# Patient Record
Sex: MALE | Race: WHITE | NOT HISPANIC OR LATINO | Employment: FULL TIME | URBAN - METROPOLITAN AREA
[De-identification: names, ages, dates, MRNs, and addresses within clinical notes are randomized per-mention and may not be internally consistent; named-entity substitution may affect disease eponyms.]

---

## 2018-01-19 ENCOUNTER — TELEPHONE (OUTPATIENT)
Dept: GASTROENTEROLOGY | Facility: CLINIC | Age: 38
End: 2018-01-19

## 2018-01-19 DIAGNOSIS — R94.5 ABNORMAL RESULTS OF LIVER FUNCTION STUDIES: Primary | ICD-10-CM

## 2018-01-19 NOTE — TELEPHONE ENCOUNTER
Per Dr. Price: He needs to see me in 6 months ( I gave him appt number)   What I need help with: he needs labs in about 2 months: LFTs and CBC.  Writer left a message for pt.

## 2018-03-27 ENCOUNTER — TRANSFERRED RECORDS (OUTPATIENT)
Dept: HEALTH INFORMATION MANAGEMENT | Facility: CLINIC | Age: 38
End: 2018-03-27

## 2018-03-28 LAB
ALBUMIN SERPL-MCNC: 4.2 G/DL
ALP SERPL-CCNC: 107 U/L
ALT SERPL-CCNC: 31 U/L
AST SERPL-CCNC: 38 U/L
BILIRUB SERPL-MCNC: 1 MG/DL
BILIRUBIN DIRECT: 0.4 MG/DL
ERYTHROCYTE [DISTWIDTH] IN BLOOD BY AUTOMATED COUNT: 13.8 %
HCT VFR BLD AUTO: 40.1 % (ref 41–53)
HEMOGLOBIN: 13.8 G/DL (ref 13.3–17.7)
MCH RBC QN AUTO: 32.2 PG
MCHC RBC AUTO-ENTMCNC: 34.4 G/DL
MCV RBC AUTO: 93.5 FL
PLATELET # BLD AUTO: 140 10^9/L (ref 150–450)
PROT SERPL-MCNC: 7.4 G/DL
RBC # BLD AUTO: 4.29 10^12/L (ref 4.5–5.9)
WBC # BLD AUTO: 5.5 10^9/L

## 2018-04-02 ENCOUNTER — TELEPHONE (OUTPATIENT)
Dept: GASTROENTEROLOGY | Facility: CLINIC | Age: 38
End: 2018-04-02

## 2018-04-02 DIAGNOSIS — R94.5 ABNORMAL RESULTS OF LIVER FUNCTION STUDIES: Primary | ICD-10-CM

## 2018-04-02 NOTE — TELEPHONE ENCOUNTER
----- Message from Elle Price MD sent at 3/29/2018  3:11 PM CDT -----  Thank you! He can stay on same 2 meds and same doses. Repeat LFTs and CBC in 3 months.  ----- Message -----     From: Latha Fam LPN     Sent: 3/29/2018   2:33 PM       To: Elle Price MD    Meds updated.

## 2018-06-07 ENCOUNTER — TELEPHONE (OUTPATIENT)
Dept: GASTROENTEROLOGY | Facility: CLINIC | Age: 38
End: 2018-06-07

## 2018-06-07 NOTE — TELEPHONE ENCOUNTER
[facility] Call Center    Phone Message    May a detailed message be left on voicemail: yes    Reason for Call: Medication Refill Request    Has the patient contacted the pharmacy for the refill? Yes   Name of medication being requested: URSODIOL PO and AZATHIOPRINE PO  Provider who prescribed the medication: Albert  Pharmacy: [facility]  Date medication is needed: 6/8/18         Action Taken: Message routed to:  [facility] Pharmacy faxed refill request to the clinic on Mon. Pt is almost out so [facility] [phone]

## 2018-06-08 DIAGNOSIS — R94.5 ABNORMAL RESULTS OF LIVER FUNCTION STUDIES: Primary | ICD-10-CM

## 2018-06-08 RX ORDER — URSODIOL 300 MG/1
300 CAPSULE ORAL 2 TIMES DAILY
Qty: 180 CAPSULE | Refills: 0 | Status: SHIPPED | OUTPATIENT
Start: 2018-06-08 | End: 2018-06-19

## 2018-06-08 RX ORDER — AZATHIOPRINE 50 MG/1
75 TABLET ORAL DAILY
Qty: 135 TABLET | Refills: 0 | Status: SHIPPED | OUTPATIENT
Start: 2018-06-08 | End: 2018-06-19

## 2018-06-19 ENCOUNTER — OFFICE VISIT (OUTPATIENT)
Dept: GASTROENTEROLOGY | Facility: CLINIC | Age: 38
End: 2018-06-19
Attending: INTERNAL MEDICINE
Payer: COMMERCIAL

## 2018-06-19 VITALS
HEART RATE: 55 BPM | DIASTOLIC BLOOD PRESSURE: 79 MMHG | WEIGHT: 148.4 LBS | BODY MASS INDEX: 22.49 KG/M2 | SYSTOLIC BLOOD PRESSURE: 129 MMHG | HEIGHT: 68 IN | OXYGEN SATURATION: 100 %

## 2018-06-19 DIAGNOSIS — R94.5 ABNORMAL RESULTS OF LIVER FUNCTION STUDIES: ICD-10-CM

## 2018-06-19 PROBLEM — K75.4 AUTOIMMUNE HEPATITIS (H): Status: ACTIVE | Noted: 2018-06-19

## 2018-06-19 LAB
ALBUMIN SERPL-MCNC: 4.2 G/DL (ref 3.4–5)
ALP SERPL-CCNC: 129 U/L (ref 40–150)
ALT SERPL W P-5'-P-CCNC: 34 U/L (ref 0–70)
ANION GAP SERPL CALCULATED.3IONS-SCNC: 7 MMOL/L (ref 3–14)
AST SERPL W P-5'-P-CCNC: 33 U/L (ref 0–45)
BILIRUB DIRECT SERPL-MCNC: 0.2 MG/DL (ref 0–0.2)
BILIRUB SERPL-MCNC: 0.6 MG/DL (ref 0.2–1.3)
BUN SERPL-MCNC: 15 MG/DL (ref 7–30)
CALCIUM SERPL-MCNC: 9.1 MG/DL (ref 8.5–10.1)
CHLORIDE SERPL-SCNC: 105 MMOL/L (ref 94–109)
CO2 SERPL-SCNC: 27 MMOL/L (ref 20–32)
CREAT SERPL-MCNC: 0.97 MG/DL (ref 0.66–1.25)
ERYTHROCYTE [DISTWIDTH] IN BLOOD BY AUTOMATED COUNT: 13.6 % (ref 10–15)
GFR SERPL CREATININE-BSD FRML MDRD: 88 ML/MIN/1.7M2
GLUCOSE SERPL-MCNC: 75 MG/DL (ref 70–99)
HCT VFR BLD AUTO: 41 % (ref 40–53)
HGB BLD-MCNC: 13.3 G/DL (ref 13.3–17.7)
INR PPP: 1.19 (ref 0.86–1.14)
MCH RBC QN AUTO: 32.9 PG (ref 26.5–33)
MCHC RBC AUTO-ENTMCNC: 32.4 G/DL (ref 31.5–36.5)
MCV RBC AUTO: 102 FL (ref 78–100)
PLATELET # BLD AUTO: 111 10E9/L (ref 150–450)
POTASSIUM SERPL-SCNC: 4.3 MMOL/L (ref 3.4–5.3)
PROT SERPL-MCNC: 7.9 G/DL (ref 6.8–8.8)
RBC # BLD AUTO: 4.04 10E12/L (ref 4.4–5.9)
SODIUM SERPL-SCNC: 139 MMOL/L (ref 133–144)
WBC # BLD AUTO: 3.6 10E9/L (ref 4–11)

## 2018-06-19 PROCEDURE — G0463 HOSPITAL OUTPT CLINIC VISIT: HCPCS | Mod: ZF

## 2018-06-19 PROCEDURE — 80076 HEPATIC FUNCTION PANEL: CPT | Performed by: INTERNAL MEDICINE

## 2018-06-19 PROCEDURE — 85027 COMPLETE CBC AUTOMATED: CPT | Performed by: INTERNAL MEDICINE

## 2018-06-19 PROCEDURE — 80048 BASIC METABOLIC PNL TOTAL CA: CPT | Performed by: INTERNAL MEDICINE

## 2018-06-19 PROCEDURE — 85610 PROTHROMBIN TIME: CPT | Performed by: INTERNAL MEDICINE

## 2018-06-19 PROCEDURE — 36415 COLL VENOUS BLD VENIPUNCTURE: CPT | Performed by: INTERNAL MEDICINE

## 2018-06-19 RX ORDER — AZATHIOPRINE 50 MG/1
50 TABLET ORAL DAILY
Qty: 90 TABLET | Refills: 3 | Status: SHIPPED | OUTPATIENT
Start: 2018-06-19 | End: 2018-08-09 | Stop reason: DRUGHIGH

## 2018-06-19 RX ORDER — URSODIOL 300 MG/1
300 CAPSULE ORAL 2 TIMES DAILY
Qty: 180 CAPSULE | Refills: 3 | Status: SHIPPED | OUTPATIENT
Start: 2018-06-19 | End: 2018-09-11

## 2018-06-19 ASSESSMENT — PAIN SCALES - GENERAL: PAINLEVEL: NO PAIN (0)

## 2018-06-19 NOTE — LETTER
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 Cirrhosis well compensated.     Continue Imuran but decrease to 50 mg po. Labs in 1-2 weeks. Medications renewed.  HCC screening due.  EGD UTD from 2/15/17: no varices    RTC 6 months, labs and US for screening every 6 months.  This was a 30 minute visit, over 50% counseling and coordination of care.    Elle Price MD  Hepatology/Liver Transplant  Medical Director, Liver Transplantation  [facility]  ========================================================================  S:  37 year old you male with AIH.  I previously followed him at . He was diagnosed with cirrhosis of unknown etiology about 3 years ago. Then liver tests became markedly elevated in July 2017: , , Tbili 5, Aphos 343. He was tested for viral etiologies. 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 There was a moderately dense inflammatory infiltrate of lymphocytes, macrophages, eosinophiles and plasma cells. Autoimmune hepatitis could not be excluded, but it was not classic for it. He was tested for antibodies in 2015 and 2014, which were negative.    He was started on prednisone and Imuran. He responded well to this. He has been off prednisone now for about 8 months. He is on Imuran 75 mg. His liver tests have been in the normal range for the last 8 months. ALT and AST uxp856 and 33, respectively. Alkaline phosphatase is 129, total bilirubin is 0.6. His CBC shows hgb 13.1, WBC 3.6, plt 111. INR is 1.19    MELD-Na score: 8 at 6/19/2018  8:56 AM  MELD score: 8 at 6/19/2018  8:56 AM  Calculated from:  Serum Creatinine: 0.97 mg/dL (Rounded to 1) at 6/19/2018  8:56 AM  Serum Sodium: 139 mmol/L (Rounded to 137) at 6/19/2018  8:56 AM  Total Bilirubin: 0.6 mg/dL (Rounded to 1) at 6/19/2018  8:56 AM  INR(ratio):  "1.19 at 6/19/2018  8:56 AM  Age: 37 years    Doing well.  No new health problems. No new family history.    SHx: Wife is pregnant with twins, a boy and a girl. Due end of August.    O:  /79  Pulse 55  Ht 1.727 m (5' 8\")  Wt 67.3 kg (148 lb 6.4 oz)  SpO2 100%  BMI 22.56 kg/m2  Gen: No acute distress  Head: Normocephalic atraumatic  Eyes: Sclera anicteric  Neck: No cervical lymphadenopathy  Respiratory: Clear to auscultation bilaterally, no overt wheezing or rales  CV: RRR   Abdomen:  Soft, nontender, nondistended, normal bowel sounds  Extrem: No edema  Skin: No jaundice, spider angiomata or palmar erythema.  No rashes.   Neuro: Alert and oriented. No asterixis.    MSK: Grossly Intact  Psych: Normal speech, normal affect        Elle Price MD      "

## 2018-06-19 NOTE — LETTER
Date:June 20, 2018      Provider requested that no letter be sent. Do not send.       [facility] Health Information

## 2018-06-19 NOTE — PROGRESS NOTES
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 Cirrhosis well compensated.     Continue Imuran but decrease to 50 mg po. Labs in 1-2 weeks. Medications renewed.  HCC screening due.  EGD UTD from 2/15/17: no varices    RTC 6 months, labs and US for screening every 6 months.  This was a 30 minute visit, over 50% counseling and coordination of care.    Elle Price MD  Hepatology/Liver Transplant  Medical Director, Liver Transplantation  [facility]  ========================================================================  S:  37 year old you male with AIH.  I previously followed him at . He was diagnosed with cirrhosis of unknown etiology about 3 years ago. Then liver tests became markedly elevated in July 2017: , , Tbili 5, Aphos 343. He was tested for viral etiologies. 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 There was a moderately dense inflammatory infiltrate of lymphocytes, macrophages, eosinophiles and plasma cells. Autoimmune hepatitis could not be excluded, but it was not classic for it. He was tested for antibodies in 2015 and 2014, which were negative.    He was started on prednisone and Imuran. He responded well to this. He has been off prednisone now for about 8 months. He is on Imuran 75 mg. His liver tests have been in the normal range for the last 8 months. ALT and AST wss779 and 33, respectively. Alkaline phosphatase is 129, total bilirubin is 0.6. His CBC shows hgb 13.1, WBC 3.6, plt 111. INR is 1.19    MELD-Na score: 8 at 6/19/2018  8:56 AM  MELD score: 8 at 6/19/2018  8:56 AM  Calculated from:  Serum Creatinine: 0.97 mg/dL (Rounded to 1) at 6/19/2018  8:56 AM  Serum Sodium: 139 mmol/L (Rounded to 137) at 6/19/2018  8:56 AM  Total Bilirubin: 0.6 mg/dL (Rounded to 1) at 6/19/2018  8:56 AM  INR(ratio): 1.19 at 6/19/2018  8:56 AM  Age: 37 years    Doing well.  No new health  "problems. No new family history.    SHx: Wife is pregnant with twins, a boy and a girl. Due end of August.    O:  /79  Pulse 55  Ht 1.727 m (5' 8\")  Wt 67.3 kg (148 lb 6.4 oz)  SpO2 100%  BMI 22.56 kg/m2  Gen: No acute distress  Head: Normocephalic atraumatic  Eyes: Sclera anicteric  Neck: No cervical lymphadenopathy  Respiratory: Clear to auscultation bilaterally, no overt wheezing or rales  CV: RRR   Abdomen:  Soft, nontender, nondistended, normal bowel sounds  Extrem: No edema  Skin: No jaundice, spider angiomata or palmar erythema.  No rashes.   Neuro: Alert and oriented. No asterixis.    MSK: Grossly Intact  Psych: Normal speech, normal affect      "

## 2018-06-19 NOTE — MR AVS SNAPSHOT
After Visit Summary   6/19/2018    Kiel Davidson    MRN: [id number]           Patient Information     Date Of Birth          1980        Visit Information        Provider Department      6/19/2018 9:30 AM Elle Price MD [facility] Hepatology        Today's Diagnoses     Abnormal results of liver function studies           Follow-ups after your visit        Follow-up notes from your care team     Return in about 6 months (around 12/19/2018).      Your next 10 appointments already scheduled     Dec 04, 2018  8:00 AM CST   (Arrive by 7:45 AM)   Return General Liver with Elle Price MD   [facility] Hepatology ([facility])    [address]  [address]  [place] [address] [phone]              Future tests that were ordered for you today     Open Future Orders        Priority Expected Expires Ordered    Hepatic panel Routine 6/26/2018 8/19/2018 6/19/2018    CBC with platelets Routine 6/26/2018 8/19/2018 6/19/2018    US Abdomen Limited* Routine  6/19/2019 6/19/2018            Who to contact     If you have questions or need follow up information about today's clinic visit or your schedule please contact [facility] HEPATOLOGY directly at [phone].  Normal or non-critical lab and imaging results will be communicated to you by MyChart, letter or phone within 4 business days after the clinic has received the results. If you do not hear from us within 7 days, please contact the clinic through [employer] or phone. If you have a critical or abnormal lab result, we will notify you by phone as soon as possible.  Submit refill requests through [employer] or call your pharmacy and they will forward the refill request to us. Please allow 3 business days for your refill to be completed.          Additional Information About Your Visit        [employer] Information     [employer] gives you secure access to your electronic health record. If you see a  "primary care provider, you can also send messages to your care team and make appointments. If you have questions, please call your primary care clinic.  If you do not have a primary care provider, please call [phone] and they will assist you.        Care EveryWhere ID     This is your Care EveryWhere ID. This could be used by other organizations to access your [place] medical records  [id number]        Your Vitals Were     Pulse Height Pulse Oximetry BMI (Body Mass Index)          55 1.727 m (5' 8\") 100% 22.56 kg/m2         Blood Pressure from Last 3 Encounters:   06/19/18 129/79    Weight from Last 3 Encounters:   06/19/18 67.3 kg (148 lb 6.4 oz)                 Today's Medication Changes          These changes are accurate as of 6/19/18 10:12 AM.  If you have any questions, ask your nurse or doctor.               These medicines have changed or have updated prescriptions.        Dose/Directions    azaTHIOprine 50 MG tablet   Commonly known as:  IMURAN   This may have changed:  how much to take   Used for:  Abnormal results of liver function studies   Changed by:  Elle Price MD        Dose:  50 mg   Take 1 tablet (50 mg) by mouth daily   Quantity:  90 tablet   Refills:  3            Where to get your medicines      These medications were sent to [facility] [address]  [address], [facility] [address]     Phone:  [phone]     azaTHIOprine 50 MG tablet    ursodiol 300 MG capsule                Primary Care Provider Fax #    Physician No Ref-Primary [phone]       No address on file        Equal Access to Services     DIAMOND ANDERSON AH: Hadii fatemeh ku hadasho Soomaali, waaxda luqadaha, qaybta kaalmada adeegyada, waxay tyson tabares. So [facility] [phone].    ATENCIÓN: Si habla español, tiene a hammonds disposición servicios gratuitos de asistencia lingüística. Llame al [phone].    We comply with applicable federal " civil rights laws and [place] laws. We do not discriminate on the basis of race, color, national origin, age, disability, sex, sexual orientation, or gender identity.            Thank you!     Thank you for choosing [facility] HEPATOLOGY  for your care. Our goal is always to provide you with excellent care. Hearing back from our patients is one way we can continue to improve our services. Please take a few minutes to complete the written survey that you may receive in the mail after your visit with us. Thank you!             Your Updated Medication List - Protect others around you: Learn how to safely use, store and throw away your medicines at www.disposemymeds.org.          This list is accurate as of 6/19/18 10:12 AM.  Always use your most recent med list.                   Brand Name Dispense Instructions for use Diagnosis    azaTHIOprine 50 MG tablet    IMURAN    90 tablet    Take 1 tablet (50 mg) by mouth daily    Abnormal results of liver function studies       ursodiol 300 MG capsule    ACTIGALL    180 capsule    Take 1 capsule (300 mg) by mouth 2 times daily    Abnormal results of liver function studies

## 2018-06-27 ENCOUNTER — TELEPHONE (OUTPATIENT)
Dept: GASTROENTEROLOGY | Facility: CLINIC | Age: 38
End: 2018-06-27

## 2018-06-27 NOTE — TELEPHONE ENCOUNTER
[facility] Call Center    Phone Message    May a detailed message be left on voicemail: yes    Reason for Call: Order(s): Other:   Reason for requested: Lab Orders  Date needed: 6/27/2018  Provider name: Dr. Price    Patient is going into [facility], [facility] tomorrow (6/27/2018) at 8:30am for labs, please send orders to [phone]    Action Taken: Message routed to:  [facility] Hepatology

## 2018-06-29 ENCOUNTER — EXTERNAL ORDER RESULTS (OUTPATIENT)
Dept: GASTROENTEROLOGY | Facility: CLINIC | Age: 38
End: 2018-06-29

## 2018-06-29 LAB
ALBUMIN SERPL-MCNC: 4 G/DL
ALP SERPL-CCNC: 120 U/L
ALT SERPL-CCNC: 27 U/L
AST SERPL-CCNC: 33 U/L
BILIRUB SERPL-MCNC: 0.6 MG/DL
BILIRUBIN DIRECT: 0.3 MG/DL
ERYTHROCYTE [DISTWIDTH] IN BLOOD BY AUTOMATED COUNT: 14 %
HCT VFR BLD AUTO: 41 %
HEMOGLOBIN: 13.4 G/DL (ref 13.3–17.7)
MCH RBC QN AUTO: 32.3 PG
MCHC RBC AUTO-ENTMCNC: 32.7 G/DL
MCV RBC AUTO: 98.8 FL
PLATELET # BLD AUTO: 128 10^9/L (ref 150–450)
PROT SERPL-MCNC: 7.4 G/DL
RBC # BLD AUTO: 4.15 10^12/L (ref 4.5–5.9)
WBC # BLD AUTO: 3.5 10^9/L (ref 4–11)

## 2018-07-02 DIAGNOSIS — K75.4 AUTOIMMUNE HEPATITIS (H): Primary | ICD-10-CM

## 2018-07-03 ENCOUNTER — TELEPHONE (OUTPATIENT)
Dept: GASTROENTEROLOGY | Facility: CLINIC | Age: 38
End: 2018-07-03

## 2018-07-03 NOTE — TELEPHONE ENCOUNTER
[facility] Call Center    Phone Message    May a detailed message be left on voicemail: yes    Reason for Call: Other: Pt wants to inform Kristen that his US results from [facility] can be viewed on Epic on Care Everywhere     Action Taken: Message routed to:  [facility] ([facility] Hep

## 2018-07-03 NOTE — TELEPHONE ENCOUNTER
[facility] Call Center    Phone Message    May a detailed message be left on voicemail: yes    Reason for Call: Other: Pt called to get results of lab and US from Dr. Price.  Please have Dr. Price follow up with pt.  Thank you.     Action Taken: Other: [facility]atology [facility]

## 2018-07-04 NOTE — TELEPHONE ENCOUNTER
Thanks for letting me know it was done. We don't get notification when it is done outside our system, until the report is faxed, which is often a delay.    The US looks fine. No evidence of any spots or concern for cancer.   Have a good summer and best wishes on your upcoming twins delivery!

## 2018-07-25 DIAGNOSIS — R94.5 ABNORMAL RESULTS OF LIVER FUNCTION STUDIES: ICD-10-CM

## 2018-07-25 LAB
ALBUMIN SERPL-MCNC: 4.2 G/DL (ref 3.4–5)
ALP SERPL-CCNC: 166 U/L (ref 40–150)
ALT SERPL W P-5'-P-CCNC: 53 U/L (ref 0–70)
AST SERPL W P-5'-P-CCNC: 45 U/L (ref 0–45)
BILIRUB DIRECT SERPL-MCNC: 0.2 MG/DL (ref 0–0.2)
BILIRUB SERPL-MCNC: 0.6 MG/DL (ref 0.2–1.3)
ERYTHROCYTE [DISTWIDTH] IN BLOOD BY AUTOMATED COUNT: 13.1 % (ref 10–15)
HCT VFR BLD AUTO: 42.6 % (ref 40–53)
HGB BLD-MCNC: 13.8 G/DL (ref 13.3–17.7)
MCH RBC QN AUTO: 32.5 PG (ref 26.5–33)
MCHC RBC AUTO-ENTMCNC: 32.4 G/DL (ref 31.5–36.5)
MCV RBC AUTO: 100 FL (ref 78–100)
PLATELET # BLD AUTO: 116 10E9/L (ref 150–450)
PROT SERPL-MCNC: 8.1 G/DL (ref 6.8–8.8)
RBC # BLD AUTO: 4.25 10E12/L (ref 4.4–5.9)
WBC # BLD AUTO: 5.4 10E9/L (ref 4–11)

## 2018-07-25 PROCEDURE — 36415 COLL VENOUS BLD VENIPUNCTURE: CPT | Performed by: INTERNAL MEDICINE

## 2018-07-25 PROCEDURE — 80076 HEPATIC FUNCTION PANEL: CPT | Performed by: INTERNAL MEDICINE

## 2018-07-25 PROCEDURE — 85027 COMPLETE CBC AUTOMATED: CPT | Performed by: INTERNAL MEDICINE

## 2018-07-26 ENCOUNTER — TELEPHONE (OUTPATIENT)
Dept: GASTROENTEROLOGY | Facility: CLINIC | Age: 38
End: 2018-07-26

## 2018-07-26 DIAGNOSIS — K75.4 AUTOIMMUNE HEPATITIS (H): Primary | ICD-10-CM

## 2018-07-26 NOTE — TELEPHONE ENCOUNTER
Left a message for pt to call regarding new orders from Dr. Price as follows: Slight uptrend in liver tests with Imuran 50 mg daily. Please recheck in 2 weeks. If still at this level. Would go back to Imuran 75 mg daily.  Will await return call.

## 2018-07-26 NOTE — TELEPHONE ENCOUNTER
Pt returned my call and was instructed of new orders. Pt plans to get labs drawn at a [facility] near his home.

## 2018-08-08 DIAGNOSIS — R94.5 ABNORMAL RESULTS OF LIVER FUNCTION STUDIES: ICD-10-CM

## 2018-08-08 DIAGNOSIS — K75.4 AUTOIMMUNE HEPATITIS (H): ICD-10-CM

## 2018-08-08 LAB
ALBUMIN SERPL-MCNC: 4.2 G/DL (ref 3.4–5)
ALP SERPL-CCNC: 175 U/L (ref 40–150)
ALT SERPL W P-5'-P-CCNC: 55 U/L (ref 0–70)
AST SERPL W P-5'-P-CCNC: 39 U/L (ref 0–45)
BILIRUB DIRECT SERPL-MCNC: 0.2 MG/DL (ref 0–0.2)
BILIRUB SERPL-MCNC: 0.6 MG/DL (ref 0.2–1.3)
ERYTHROCYTE [DISTWIDTH] IN BLOOD BY AUTOMATED COUNT: 12.8 % (ref 10–15)
HCT VFR BLD AUTO: 43.7 % (ref 40–53)
HGB BLD-MCNC: 14.6 G/DL (ref 13.3–17.7)
MCH RBC QN AUTO: 32.6 PG (ref 26.5–33)
MCHC RBC AUTO-ENTMCNC: 33.4 G/DL (ref 31.5–36.5)
MCV RBC AUTO: 98 FL (ref 78–100)
PLATELET # BLD AUTO: 118 10E9/L (ref 150–450)
PROT SERPL-MCNC: 8.2 G/DL (ref 6.8–8.8)
RBC # BLD AUTO: 4.48 10E12/L (ref 4.4–5.9)
WBC # BLD AUTO: 5.5 10E9/L (ref 4–11)

## 2018-08-08 PROCEDURE — 36415 COLL VENOUS BLD VENIPUNCTURE: CPT | Performed by: INTERNAL MEDICINE

## 2018-08-08 PROCEDURE — 80076 HEPATIC FUNCTION PANEL: CPT | Performed by: INTERNAL MEDICINE

## 2018-08-08 PROCEDURE — 85027 COMPLETE CBC AUTOMATED: CPT | Performed by: INTERNAL MEDICINE

## 2018-08-09 ENCOUNTER — TELEPHONE (OUTPATIENT)
Dept: GASTROENTEROLOGY | Facility: CLINIC | Age: 38
End: 2018-08-09

## 2018-08-09 DIAGNOSIS — K75.4 AUTOIMMUNE HEPATITIS (H): Primary | ICD-10-CM

## 2018-08-09 NOTE — TELEPHONE ENCOUNTER
Instructed pt the following per Dr. Price:     Liver tets slightly up. Increase Imuran (azathioprine) to 75 mg po daily. Repeat LFTs and CBC in 2-3 weeks.     Pt able to repeat instructions.

## 2018-08-29 DIAGNOSIS — K75.4 AUTOIMMUNE HEPATITIS (H): ICD-10-CM

## 2018-08-29 LAB
ALBUMIN SERPL-MCNC: 3.7 G/DL (ref 3.4–5)
ALP SERPL-CCNC: 158 U/L (ref 40–150)
ALT SERPL W P-5'-P-CCNC: 39 U/L (ref 0–70)
AST SERPL W P-5'-P-CCNC: 33 U/L (ref 0–45)
BILIRUB DIRECT SERPL-MCNC: 0.2 MG/DL (ref 0–0.2)
BILIRUB SERPL-MCNC: 0.7 MG/DL (ref 0.2–1.3)
DIFFERENTIAL METHOD BLD: ABNORMAL
EOSINOPHIL # BLD AUTO: 1.5 10E9/L (ref 0–0.7)
EOSINOPHIL NFR BLD AUTO: 28 %
ERYTHROCYTE [DISTWIDTH] IN BLOOD BY AUTOMATED COUNT: 12.8 % (ref 10–15)
HCT VFR BLD AUTO: 40.7 % (ref 40–53)
HGB BLD-MCNC: 13.4 G/DL (ref 13.3–17.7)
LYMPHOCYTES # BLD AUTO: 1.1 10E9/L (ref 0.8–5.3)
LYMPHOCYTES NFR BLD AUTO: 22 %
MCH RBC QN AUTO: 32.3 PG (ref 26.5–33)
MCHC RBC AUTO-ENTMCNC: 32.9 G/DL (ref 31.5–36.5)
MCV RBC AUTO: 98 FL (ref 78–100)
MONOCYTES # BLD AUTO: 0.4 10E9/L (ref 0–1.3)
MONOCYTES NFR BLD AUTO: 8 %
NEUTROPHILS # BLD AUTO: 2.2 10E9/L (ref 1.6–8.3)
NEUTROPHILS NFR BLD AUTO: 42 %
PLATELET # BLD AUTO: 97 10E9/L (ref 150–450)
PROT SERPL-MCNC: 7.3 G/DL (ref 6.8–8.8)
RBC # BLD AUTO: 4.15 10E12/L (ref 4.4–5.9)
RBC MORPH BLD: ABNORMAL
WBC # BLD AUTO: 5.2 10E9/L (ref 4–11)

## 2018-08-29 PROCEDURE — 80076 HEPATIC FUNCTION PANEL: CPT | Performed by: INTERNAL MEDICINE

## 2018-08-29 PROCEDURE — 36415 COLL VENOUS BLD VENIPUNCTURE: CPT | Performed by: INTERNAL MEDICINE

## 2018-08-29 PROCEDURE — 85025 COMPLETE CBC W/AUTO DIFF WBC: CPT | Performed by: INTERNAL MEDICINE

## 2018-09-07 DIAGNOSIS — K75.4 AUTOIMMUNE HEPATITIS (H): Primary | ICD-10-CM

## 2018-09-07 RX ORDER — AZATHIOPRINE 50 MG/1
75 TABLET ORAL DAILY
Qty: 135 TABLET | Refills: 3 | Status: SHIPPED | OUTPATIENT
Start: 2018-09-07 | End: 2018-12-04

## 2018-09-11 DIAGNOSIS — R94.5 ABNORMAL RESULTS OF LIVER FUNCTION STUDIES: ICD-10-CM

## 2018-09-11 RX ORDER — URSODIOL 300 MG/1
300 CAPSULE ORAL 2 TIMES DAILY
Qty: 180 CAPSULE | Refills: 3 | Status: SHIPPED | OUTPATIENT
Start: 2018-09-11 | End: 2018-12-04

## 2018-11-30 ENCOUNTER — DOCUMENTATION ONLY (OUTPATIENT)
Dept: LAB | Facility: CLINIC | Age: 38
End: 2018-11-30

## 2018-12-03 ENCOUNTER — APPOINTMENT (OUTPATIENT)
Dept: LAB | Facility: CLINIC | Age: 38
End: 2018-12-03
Payer: COMMERCIAL

## 2018-12-03 DIAGNOSIS — K75.4 AUTOIMMUNE HEPATITIS (H): Primary | ICD-10-CM

## 2018-12-04 ENCOUNTER — OFFICE VISIT (OUTPATIENT)
Dept: GASTROENTEROLOGY | Facility: CLINIC | Age: 38
End: 2018-12-04
Attending: INTERNAL MEDICINE
Payer: COMMERCIAL

## 2018-12-04 VITALS
WEIGHT: 142.2 LBS | SYSTOLIC BLOOD PRESSURE: 139 MMHG | HEIGHT: 68 IN | BODY MASS INDEX: 21.55 KG/M2 | OXYGEN SATURATION: 100 % | DIASTOLIC BLOOD PRESSURE: 87 MMHG | RESPIRATION RATE: 16 BRPM | HEART RATE: 73 BPM | TEMPERATURE: 98 F

## 2018-12-04 DIAGNOSIS — K75.4 AUTOIMMUNE HEPATITIS (H): ICD-10-CM

## 2018-12-04 DIAGNOSIS — R94.5 ABNORMAL RESULTS OF LIVER FUNCTION STUDIES: ICD-10-CM

## 2018-12-04 LAB
ALBUMIN SERPL-MCNC: 4.1 G/DL (ref 3.4–5)
ALP SERPL-CCNC: 168 U/L (ref 40–150)
ALT SERPL W P-5'-P-CCNC: 37 U/L (ref 0–70)
ANION GAP SERPL CALCULATED.3IONS-SCNC: 7 MMOL/L (ref 3–14)
AST SERPL W P-5'-P-CCNC: 30 U/L (ref 0–45)
BILIRUB DIRECT SERPL-MCNC: 0.2 MG/DL (ref 0–0.2)
BILIRUB SERPL-MCNC: 0.8 MG/DL (ref 0.2–1.3)
BUN SERPL-MCNC: 18 MG/DL (ref 7–30)
CALCIUM SERPL-MCNC: 9.1 MG/DL (ref 8.5–10.1)
CHLORIDE SERPL-SCNC: 105 MMOL/L (ref 94–109)
CO2 SERPL-SCNC: 27 MMOL/L (ref 20–32)
CREAT SERPL-MCNC: 0.94 MG/DL (ref 0.66–1.25)
ERYTHROCYTE [DISTWIDTH] IN BLOOD BY AUTOMATED COUNT: 13.2 % (ref 10–15)
GFR SERPL CREATININE-BSD FRML MDRD: >90 ML/MIN/1.7M2
GLUCOSE SERPL-MCNC: 86 MG/DL (ref 70–99)
HCT VFR BLD AUTO: 43 % (ref 40–53)
HGB BLD-MCNC: 13.9 G/DL (ref 13.3–17.7)
INR PPP: 1.13 (ref 0.86–1.14)
MCH RBC QN AUTO: 31.7 PG (ref 26.5–33)
MCHC RBC AUTO-ENTMCNC: 32.3 G/DL (ref 31.5–36.5)
MCV RBC AUTO: 98 FL (ref 78–100)
PLATELET # BLD AUTO: 114 10E9/L (ref 150–450)
POTASSIUM SERPL-SCNC: 3.6 MMOL/L (ref 3.4–5.3)
PROT SERPL-MCNC: 8.2 G/DL (ref 6.8–8.8)
RBC # BLD AUTO: 4.39 10E12/L (ref 4.4–5.9)
SODIUM SERPL-SCNC: 139 MMOL/L (ref 133–144)
WBC # BLD AUTO: 4.9 10E9/L (ref 4–11)

## 2018-12-04 PROCEDURE — 85610 PROTHROMBIN TIME: CPT | Performed by: INTERNAL MEDICINE

## 2018-12-04 PROCEDURE — 80076 HEPATIC FUNCTION PANEL: CPT | Performed by: INTERNAL MEDICINE

## 2018-12-04 PROCEDURE — 80048 BASIC METABOLIC PNL TOTAL CA: CPT | Performed by: INTERNAL MEDICINE

## 2018-12-04 PROCEDURE — 85027 COMPLETE CBC AUTOMATED: CPT | Performed by: INTERNAL MEDICINE

## 2018-12-04 PROCEDURE — 36415 COLL VENOUS BLD VENIPUNCTURE: CPT | Performed by: INTERNAL MEDICINE

## 2018-12-04 RX ORDER — AZATHIOPRINE 50 MG/1
75 TABLET ORAL DAILY
Qty: 135 TABLET | Refills: 3 | Status: SHIPPED | OUTPATIENT
Start: 2018-12-04 | End: 2019-12-30

## 2018-12-04 RX ORDER — URSODIOL 300 MG/1
300 CAPSULE ORAL 2 TIMES DAILY
Qty: 180 CAPSULE | Refills: 3 | Status: SHIPPED | OUTPATIENT
Start: 2018-12-04 | End: 2019-12-30

## 2018-12-04 ASSESSMENT — PAIN SCALES - GENERAL: PAINLEVEL: NO PAIN (0)

## 2018-12-04 NOTE — LETTER
12/4/2018       RE: Kiel Davidson  [address]  [facility] [facility] [address]     Dear Colleague,    Thank you for referring your patient, Kiel Davidson, to the [facility] HEPATOLOGY at [facility]. Please see a copy of my visit note below.    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 Cirrhosis well compensated.      Continue Imuran 75 mg po. Labs every 3 mo. Medications renewed.  HCC screening due for US. Ordered  EGD UTD from 2/15/17: no varices Next 2020  Bone density: normal. Due 2020     RTC 12 mo  This was a 30 minute visit, over 50% counseling and coordination of care.     Elle Price MD  Hepatology/Liver Transplant  Medical Director, Liver Transplantation  [facility]  ========================================================================  S:  37 year old you male with AIH.  I previously followed him at . He was diagnosed with cirrhosis of unknown etiology about 3 years ago. Then liver tests became markedly elevated in July 2017: , , Tbili 5, Aphos 343. He was tested for viral etiologies. 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 There was a moderately dense inflammatory infiltrate of lymphocytes, macrophages, eosinophiles and plasma cells. Autoimmune hepatitis could not be excluded, but it was not classic for it. He was tested for antibodies in 2015 and 2014, which were negative.    He was started on prednisone and Imuran. He responded well to this. He has been off prednisone now for about 8 months. He is on Imuran 75 mg. His liver tests have been in the normal range for the last 8 months. ALT and AST are 347 and 33, respectively. Alkaline phosphatase is 129, total bilirubin is 0.6. His CBC shows hgb 13.1, WBC 3.6, plt 111. INR is 1.19     MELD-Na score: 7 at 12/4/2018  7:36 AM  MELD score: 7 at 12/4/2018  7:36 AM  Calculated from:  Serum  "Creatinine: 0.94 mg/dL (Rounded to 1) at 12/4/2018  7:36 AM  Serum Sodium: 139 mmol/L (Rounded to 137) at 12/4/2018  7:36 AM  Total Bilirubin: 0.8 mg/dL (Rounded to 1) at 12/4/2018  7:36 AM  INR(ratio): 1.13 at 12/4/2018  7:36 AM  Age: 37 years    Doing well.  No new health problems. No new family history.     SHx: Twins were born 5 weeks early, Erin and Jonathan. They are now 4 mo old and doing well.     O:  /87 (BP Location: Right arm, Patient Position: Sitting, Cuff Size: Adult Regular)  Pulse 73  Temp 98  F (36.7  C) (Oral)  Resp 16  Ht 1.727 m (5' 8\")  Wt 64.5 kg (142 lb 3.2 oz)  SpO2 100%  BMI 21.62 kg/m2  Gen: No acute distress  Head: Normocephalic atraumatic  Eyes: Sclera anicteric  Neck: No cervical lymphadenopathy  Respiratory: Clear to auscultation bilaterally, no overt wheezing or rales  CV: RRR   Abdomen:  Soft, nontender, nondistended, normal bowel sounds  Extrem: No edema  Skin: No jaundice, spider angiomata or palmar erythema.  No rashes.   Neuro: Alert and oriented. No asterixis.    MSK: Grossly Intact  Psych: Normal speech, normal affect    Again, thank you for allowing me to participate in the care of your patient.      Sincerely,    Elle Price MD      "

## 2018-12-04 NOTE — LETTER
Date:December 5, 2018      Patient was self referred, no letter generated. Do not send.        [facility] Health Information

## 2018-12-04 NOTE — PROGRESS NOTES
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 Cirrhosis well compensated.      Continue Imuran 75 mg po. Labs every 3 mo. Medications renewed.  HCC screening due for US. Ordered  EGD UTD from 2/15/17: no varices Next 2020  Bone density: normal. Due 2020     RTC 12 mo  This was a 30 minute visit, over 50% counseling and coordination of care.     Elle Price MD  Hepatology/Liver Transplant  Medical Director, Liver Transplantation  [facility]  ========================================================================  S:  37 year old you male with AIH.  I previously followed him at . He was diagnosed with cirrhosis of unknown etiology about 3 years ago. Then liver tests became markedly elevated in July 2017: , , Tbili 5, Aphos 343. He was tested for viral etiologies. 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 There was a moderately dense inflammatory infiltrate of lymphocytes, macrophages, eosinophiles and plasma cells. Autoimmune hepatitis could not be excluded, but it was not classic for it. He was tested for antibodies in 2015 and 2014, which were negative.    He was started on prednisone and Imuran. He responded well to this. He has been off prednisone now for about 8 months. He is on Imuran 75 mg. His liver tests have been in the normal range for the last 8 months. ALT and AST are 347 and 33, respectively. Alkaline phosphatase is 129, total bilirubin is 0.6. His CBC shows hgb 13.1, WBC 3.6, plt 111. INR is 1.19     MELD-Na score: 7 at 12/4/2018  7:36 AM  MELD score: 7 at 12/4/2018  7:36 AM  Calculated from:  Serum Creatinine: 0.94 mg/dL (Rounded to 1) at 12/4/2018  7:36 AM  Serum Sodium: 139 mmol/L (Rounded to 137) at 12/4/2018  7:36 AM  Total Bilirubin: 0.8 mg/dL (Rounded to 1) at 12/4/2018  7:36 AM  INR(ratio): 1.13 at 12/4/2018  7:36 AM  Age: 37 years    Doing well.  No new health problems.  "No new family history.     SHx: Twins were born 5 weeks early, Erin and Jonathan. They are now 4 mo old and doing well.     O:  /87 (BP Location: Right arm, Patient Position: Sitting, Cuff Size: Adult Regular)  Pulse 73  Temp 98  F (36.7  C) (Oral)  Resp 16  Ht 1.727 m (5' 8\")  Wt 64.5 kg (142 lb 3.2 oz)  SpO2 100%  BMI 21.62 kg/m2  Gen: No acute distress  Head: Normocephalic atraumatic  Eyes: Sclera anicteric  Neck: No cervical lymphadenopathy  Respiratory: Clear to auscultation bilaterally, no overt wheezing or rales  CV: RRR   Abdomen:  Soft, nontender, nondistended, normal bowel sounds  Extrem: No edema  Skin: No jaundice, spider angiomata or palmar erythema.  No rashes.   Neuro: Alert and oriented. No asterixis.    MSK: Grossly Intact  Psych: Normal speech, normal affect  "

## 2018-12-04 NOTE — MR AVS SNAPSHOT
After Visit Summary   12/4/2018    Kiel Davidson    MRN: [id number]           Patient Information     Date Of Birth          1980        Visit Information        Provider Department      12/4/2018 8:00 AM Elle Price MD [facility] Hepatology        Today's Diagnoses     Autoimmune hepatitis (H)        Abnormal results of liver function studies           Follow-ups after your visit        Follow-up notes from your care team     Return in about 1 year (around 12/4/2019).      Future tests that were ordered for you today     Open Standing Orders        Priority Remaining Interval Expires Ordered    CBC with platelets Routine 4/4 Every 3 Months 12/4/2019 12/4/2018    Hepatic panel Routine 4/4 Every 3 Months 12/4/2019 12/4/2018            Who to contact     If you have questions or need follow up information about today's clinic visit or your schedule please contact [facility] HEPATOLOGY directly at [phone].  Normal or non-critical lab and imaging results will be communicated to you by iNEWiThart, letter or phone within 4 business days after the clinic has received the results. If you do not hear from us within 7 days, please contact the clinic through iNEWiThart or phone. If you have a critical or abnormal lab result, we will notify you by phone as soon as possible.  Submit refill requests through [employer] or call your pharmacy and they will forward the refill request to us. Please allow 3 business days for your refill to be completed.          Additional Information About Your Visit        MyChart Information     [employer] gives you secure access to your electronic health record. If you see a primary care provider, you can also send messages to your care team and make appointments. If you have questions, please call your primary care clinic.  If you do not have a primary care provider, please call [phone] and they will assist you.        Care EveryWhere ID     This is your Care  "EveryWhere ID. This could be used by other organizations to access your [facility] medical records  [id number]        Your Vitals Were     Pulse Temperature Respirations Height Pulse Oximetry BMI (Body Mass Index)    73 98  F (36.7  C) (Oral) 16 1.727 m (5' 8\") 100% 21.62 kg/m2       Blood Pressure from Last 3 Encounters:   12/04/18 139/87   06/19/18 129/79    Weight from Last 3 Encounters:   12/04/18 64.5 kg (142 lb 3.2 oz)   06/19/18 67.3 kg (148 lb 6.4 oz)                 Where to get your medicines      These medications were sent to [facility] - [place], [place] - [address] [facility]  [address] [facility], [place] [address]     Phone:  [phone]     azaTHIOprine 50 MG tablet    ursodiol 300 MG capsule          Primary Care Provider Fax #    Physician No Ref-Primary [phone]       No address on file        Equal Access to Services     [facility] : Hadii fatemeh ku hadasho Soomaali, waaxda luqadaha, qaybta kaalmada adeegyada, waxay alissain marlys daly . So [facility] [phone].    ATENCIÓN: Si habla español, tiene a hammonds disposición servicios gratuitos de asistencia lingüística. Llame al [phone].    We comply with applicable federal civil rights laws and [place] laws. We do not discriminate on the basis of race, color, national origin, age, disability, sex, sexual orientation, or gender identity.            Thank you!     Thank you for choosing [facility] HEPATOLOGY  for your care. Our goal is always to provide you with excellent care. Hearing back from our patients is one way we can continue to improve our services. Please take a few minutes to complete the written survey that you may receive in the mail after your visit with us. Thank you!             Your Updated Medication List - Protect others around you: Learn how to safely use, store and throw away your medicines at www.disposemymeds.org.          This list is accurate as of 12/4/18  4:16 PM.  Always use " your most recent med list.                   Brand Name Dispense Instructions for use Diagnosis    azaTHIOprine 50 MG tablet    IMURAN    135 tablet    Take 1.5 tablets (75 mg) by mouth daily    Autoimmune hepatitis (H), Abnormal results of liver function studies       ursodiol 300 MG capsule    ACTIGALL    180 capsule    Take 1 capsule (300 mg) by mouth 2 times daily    Abnormal results of liver function studies, Autoimmune hepatitis (H)

## 2018-12-04 NOTE — NURSING NOTE
"Chief Complaint   Patient presents with     RECHECK     autoimmune hepatitis       /87 (BP Location: Right arm, Patient Position: Sitting, Cuff Size: Adult Regular)  Pulse 73  Temp 98  F (36.7  C) (Oral)  Resp 16  Ht 1.727 m (5' 8\")  Wt 64.5 kg (142 lb 3.2 oz)  SpO2 100%  BMI 21.62 kg/m2      Sandra Hodge [facility]  12/4/2018 8:00 AM      "

## 2018-12-13 ENCOUNTER — TELEPHONE (OUTPATIENT)
Dept: GASTROENTEROLOGY | Facility: CLINIC | Age: 38
End: 2018-12-13

## 2018-12-21 ENCOUNTER — TELEPHONE (OUTPATIENT)
Dept: GASTROENTEROLOGY | Facility: CLINIC | Age: 38
End: 2018-12-21

## 2019-01-08 ENCOUNTER — TELEPHONE (OUTPATIENT)
Dept: GASTROENTEROLOGY | Facility: CLINIC | Age: 39
End: 2019-01-08

## 2019-01-08 NOTE — TELEPHONE ENCOUNTER
----- Message from Elle Price MD sent at 1/7/2019  7:23 PM CST -----  Ultrasound shows changes consistent with known cirrhosis.  No evidence for any early liver cancer.  ----- Message -----  From: Latha Fam LPN  Sent: 1/7/2019   9:19 AM  To: Elle Price MD, Latha Fam LPN    There is an US results in care everywhere from H.P. Please review and advise.

## 2019-03-06 DIAGNOSIS — R94.5 ABNORMAL RESULTS OF LIVER FUNCTION STUDIES: ICD-10-CM

## 2019-03-06 DIAGNOSIS — K75.4 AUTOIMMUNE HEPATITIS (H): ICD-10-CM

## 2019-03-06 LAB
ALBUMIN SERPL-MCNC: 4.1 G/DL (ref 3.4–5)
ALP SERPL-CCNC: 165 U/L (ref 40–150)
ALT SERPL W P-5'-P-CCNC: 31 U/L (ref 0–70)
AST SERPL W P-5'-P-CCNC: 25 U/L (ref 0–45)
BILIRUB DIRECT SERPL-MCNC: 0.2 MG/DL (ref 0–0.2)
BILIRUB SERPL-MCNC: 0.6 MG/DL (ref 0.2–1.3)
ERYTHROCYTE [DISTWIDTH] IN BLOOD BY AUTOMATED COUNT: 13.4 % (ref 10–15)
HCT VFR BLD AUTO: 41 % (ref 40–53)
HGB BLD-MCNC: 13.5 G/DL (ref 13.3–17.7)
MCH RBC QN AUTO: 32.7 PG (ref 26.5–33)
MCHC RBC AUTO-ENTMCNC: 32.9 G/DL (ref 31.5–36.5)
MCV RBC AUTO: 99 FL (ref 78–100)
PLATELET # BLD AUTO: 112 10E9/L (ref 150–450)
PROT SERPL-MCNC: 7.8 G/DL (ref 6.8–8.8)
RBC # BLD AUTO: 4.13 10E12/L (ref 4.4–5.9)
WBC # BLD AUTO: 4.1 10E9/L (ref 4–11)

## 2019-03-06 PROCEDURE — 36415 COLL VENOUS BLD VENIPUNCTURE: CPT | Performed by: INTERNAL MEDICINE

## 2019-03-06 PROCEDURE — 80076 HEPATIC FUNCTION PANEL: CPT | Performed by: INTERNAL MEDICINE

## 2019-03-06 PROCEDURE — 85027 COMPLETE CBC AUTOMATED: CPT | Performed by: INTERNAL MEDICINE

## 2019-06-04 ENCOUNTER — TELEPHONE (OUTPATIENT)
Dept: GASTROENTEROLOGY | Facility: CLINIC | Age: 39
End: 2019-06-04

## 2019-06-04 NOTE — TELEPHONE ENCOUNTER
[facility] Call Center    Phone Message    May a detailed message be left on voicemail: yes    Reason for Call: Order(s): Other:   Reason for requested:  US Order to be sent to [facility]  [phone] (Fax)  Date needed:  ASA  Provider name: Albert      Action Taken: Message routed to:  [facility] Hepatology

## 2019-06-11 DIAGNOSIS — K75.4 AUTOIMMUNE HEPATITIS (H): ICD-10-CM

## 2019-06-11 DIAGNOSIS — R94.5 ABNORMAL RESULTS OF LIVER FUNCTION STUDIES: ICD-10-CM

## 2019-06-11 LAB
ALBUMIN SERPL-MCNC: 4.2 G/DL (ref 3.4–5)
ALP SERPL-CCNC: 156 U/L (ref 40–150)
ALT SERPL W P-5'-P-CCNC: 24 U/L (ref 0–70)
AST SERPL W P-5'-P-CCNC: 27 U/L (ref 0–45)
BILIRUB DIRECT SERPL-MCNC: 0.3 MG/DL (ref 0–0.2)
BILIRUB SERPL-MCNC: 0.8 MG/DL (ref 0.2–1.3)
ERYTHROCYTE [DISTWIDTH] IN BLOOD BY AUTOMATED COUNT: 13 % (ref 10–15)
HCT VFR BLD AUTO: 41.6 % (ref 40–53)
HGB BLD-MCNC: 14.1 G/DL (ref 13.3–17.7)
MCH RBC QN AUTO: 33.1 PG (ref 26.5–33)
MCHC RBC AUTO-ENTMCNC: 33.9 G/DL (ref 31.5–36.5)
MCV RBC AUTO: 98 FL (ref 78–100)
PLATELET # BLD AUTO: 108 10E9/L (ref 150–450)
PROT SERPL-MCNC: 8.3 G/DL (ref 6.8–8.8)
RBC # BLD AUTO: 4.26 10E12/L (ref 4.4–5.9)
WBC # BLD AUTO: 7.3 10E9/L (ref 4–11)

## 2019-06-11 PROCEDURE — 36415 COLL VENOUS BLD VENIPUNCTURE: CPT | Performed by: INTERNAL MEDICINE

## 2019-06-11 PROCEDURE — 80076 HEPATIC FUNCTION PANEL: CPT | Performed by: INTERNAL MEDICINE

## 2019-06-11 PROCEDURE — 85027 COMPLETE CBC AUTOMATED: CPT | Performed by: INTERNAL MEDICINE

## 2019-06-12 ENCOUNTER — TRANSFERRED RECORDS (OUTPATIENT)
Dept: HEALTH INFORMATION MANAGEMENT | Facility: CLINIC | Age: 39
End: 2019-06-12

## 2019-06-13 ENCOUNTER — DOCUMENTATION ONLY (OUTPATIENT)
Dept: GASTROENTEROLOGY | Facility: CLINIC | Age: 39
End: 2019-06-13

## 2019-06-14 NOTE — PROGRESS NOTES
Called patient let him know hiis abdominal ultrasound was reviewed and showed changes of chronic liver disease.  No hepatic masses noted.     Patient expressed understanding. No further questions or concerns.

## 2019-09-05 DIAGNOSIS — K75.4 AUTOIMMUNE HEPATITIS (H): ICD-10-CM

## 2019-09-05 DIAGNOSIS — R94.5 ABNORMAL RESULTS OF LIVER FUNCTION STUDIES: ICD-10-CM

## 2019-09-05 LAB
ALBUMIN SERPL-MCNC: 4 G/DL (ref 3.4–5)
ALP SERPL-CCNC: 127 U/L (ref 40–150)
ALT SERPL W P-5'-P-CCNC: 20 U/L (ref 0–70)
AST SERPL W P-5'-P-CCNC: 26 U/L (ref 0–45)
BILIRUB DIRECT SERPL-MCNC: 0.3 MG/DL (ref 0–0.2)
BILIRUB SERPL-MCNC: 0.8 MG/DL (ref 0.2–1.3)
ERYTHROCYTE [DISTWIDTH] IN BLOOD BY AUTOMATED COUNT: 13.1 % (ref 10–15)
HCT VFR BLD AUTO: 41.8 % (ref 40–53)
HGB BLD-MCNC: 13.9 G/DL (ref 13.3–17.7)
MCH RBC QN AUTO: 32.1 PG (ref 26.5–33)
MCHC RBC AUTO-ENTMCNC: 33.3 G/DL (ref 31.5–36.5)
MCV RBC AUTO: 97 FL (ref 78–100)
PLATELET # BLD AUTO: 95 10E9/L (ref 150–450)
PROT SERPL-MCNC: 8.1 G/DL (ref 6.8–8.8)
RBC # BLD AUTO: 4.33 10E12/L (ref 4.4–5.9)
WBC # BLD AUTO: 7.5 10E9/L (ref 4–11)

## 2019-09-05 PROCEDURE — 36415 COLL VENOUS BLD VENIPUNCTURE: CPT | Performed by: INTERNAL MEDICINE

## 2019-09-05 PROCEDURE — 80076 HEPATIC FUNCTION PANEL: CPT | Performed by: INTERNAL MEDICINE

## 2019-09-05 PROCEDURE — 85027 COMPLETE CBC AUTOMATED: CPT | Performed by: INTERNAL MEDICINE

## 2019-11-29 ENCOUNTER — TELEPHONE (OUTPATIENT)
Dept: GASTROENTEROLOGY | Facility: CLINIC | Age: 39
End: 2019-11-29

## 2019-11-29 NOTE — TELEPHONE ENCOUNTER
Ultrasound order faxed and lab orders updated.  Patient aware.    Jaylyn RIVERS LPN  Hepatology Clinic      [facility]    Phone Message    May a detailed message be left on voicemail: yes    Reason for Call: Order(s): Other:   Reason for requested: Ultrasound Orders  Date needed: 11/29/2019  Provider name: Price    Please fax Ultrasound order to #[phone]. Pt is also needing his lab orders updated, they will be expiring 12/4/2019. Please advise      Action Taken: Hepatology

## 2019-12-05 DIAGNOSIS — K75.4 AUTOIMMUNE HEPATITIS (H): ICD-10-CM

## 2019-12-05 DIAGNOSIS — R94.5 ABNORMAL RESULTS OF LIVER FUNCTION STUDIES: ICD-10-CM

## 2019-12-05 LAB
ALBUMIN SERPL-MCNC: 3.7 G/DL (ref 3.4–5)
ALP SERPL-CCNC: 181 U/L (ref 40–150)
ALT SERPL W P-5'-P-CCNC: 36 U/L (ref 0–70)
AST SERPL W P-5'-P-CCNC: 44 U/L (ref 0–45)
BILIRUB DIRECT SERPL-MCNC: 0.3 MG/DL (ref 0–0.2)
BILIRUB SERPL-MCNC: 0.7 MG/DL (ref 0.2–1.3)
ERYTHROCYTE [DISTWIDTH] IN BLOOD BY AUTOMATED COUNT: 15 % (ref 10–15)
HCT VFR BLD AUTO: 39.1 % (ref 40–53)
HGB BLD-MCNC: 12.6 G/DL (ref 13.3–17.7)
MCH RBC QN AUTO: 31.3 PG (ref 26.5–33)
MCHC RBC AUTO-ENTMCNC: 32.2 G/DL (ref 31.5–36.5)
MCV RBC AUTO: 97 FL (ref 78–100)
PLATELET # BLD AUTO: 104 10E9/L (ref 150–450)
PROT SERPL-MCNC: 8.2 G/DL (ref 6.8–8.8)
RBC # BLD AUTO: 4.03 10E12/L (ref 4.4–5.9)
WBC # BLD AUTO: 3.1 10E9/L (ref 4–11)

## 2019-12-05 PROCEDURE — 80076 HEPATIC FUNCTION PANEL: CPT | Performed by: INTERNAL MEDICINE

## 2019-12-05 PROCEDURE — 85027 COMPLETE CBC AUTOMATED: CPT | Performed by: INTERNAL MEDICINE

## 2019-12-05 PROCEDURE — 36415 COLL VENOUS BLD VENIPUNCTURE: CPT | Performed by: INTERNAL MEDICINE

## 2019-12-06 ENCOUNTER — TRANSFERRED RECORDS (OUTPATIENT)
Dept: HEALTH INFORMATION MANAGEMENT | Facility: CLINIC | Age: 39
End: 2019-12-06

## 2019-12-13 ENCOUNTER — TELEPHONE (OUTPATIENT)
Dept: GASTROENTEROLOGY | Facility: CLINIC | Age: 39
End: 2019-12-13

## 2019-12-13 DIAGNOSIS — K75.4 AUTOIMMUNE HEPATITIS (H): Primary | ICD-10-CM

## 2019-12-13 NOTE — TELEPHONE ENCOUNTER
Spoke with patient. Gave patient his lab test and U/S test results per Price. Patient verbalized understanding. Patient will repeat labs in 1 week, lab orders are in the computer.      Marysol Wagner LPN

## 2019-12-19 DIAGNOSIS — K75.4 AUTOIMMUNE HEPATITIS (H): ICD-10-CM

## 2019-12-19 LAB
ALBUMIN SERPL-MCNC: 3.9 G/DL (ref 3.4–5)
ALP SERPL-CCNC: 142 U/L (ref 40–150)
ALT SERPL W P-5'-P-CCNC: 31 U/L (ref 0–70)
AST SERPL W P-5'-P-CCNC: 32 U/L (ref 0–45)
BILIRUB DIRECT SERPL-MCNC: 0.2 MG/DL (ref 0–0.2)
BILIRUB SERPL-MCNC: 0.6 MG/DL (ref 0.2–1.3)
ERYTHROCYTE [DISTWIDTH] IN BLOOD BY AUTOMATED COUNT: 14.8 % (ref 10–15)
HCT VFR BLD AUTO: 41.6 % (ref 40–53)
HGB BLD-MCNC: 13.5 G/DL (ref 13.3–17.7)
MCH RBC QN AUTO: 31.4 PG (ref 26.5–33)
MCHC RBC AUTO-ENTMCNC: 32.5 G/DL (ref 31.5–36.5)
MCV RBC AUTO: 97 FL (ref 78–100)
PLATELET # BLD AUTO: 117 10E9/L (ref 150–450)
PROT SERPL-MCNC: 8.6 G/DL (ref 6.8–8.8)
RBC # BLD AUTO: 4.3 10E12/L (ref 4.4–5.9)
WBC # BLD AUTO: 3.7 10E9/L (ref 4–11)

## 2019-12-19 PROCEDURE — 80076 HEPATIC FUNCTION PANEL: CPT | Performed by: INTERNAL MEDICINE

## 2019-12-19 PROCEDURE — 36415 COLL VENOUS BLD VENIPUNCTURE: CPT | Performed by: INTERNAL MEDICINE

## 2019-12-19 PROCEDURE — 85027 COMPLETE CBC AUTOMATED: CPT | Performed by: INTERNAL MEDICINE

## 2019-12-28 DIAGNOSIS — K75.4 AUTOIMMUNE HEPATITIS (H): ICD-10-CM

## 2019-12-28 DIAGNOSIS — R94.5 ABNORMAL RESULTS OF LIVER FUNCTION STUDIES: ICD-10-CM

## 2019-12-30 RX ORDER — AZATHIOPRINE 50 MG/1
TABLET ORAL
Qty: 135 TABLET | Refills: 3 | Status: SHIPPED | OUTPATIENT
Start: 2019-12-30 | End: 2020-02-11

## 2019-12-30 RX ORDER — URSODIOL 300 MG/1
CAPSULE ORAL
Qty: 180 CAPSULE | Refills: 3 | Status: SHIPPED | OUTPATIENT
Start: 2019-12-30 | End: 2020-02-11

## 2020-01-13 ENCOUNTER — DOCUMENTATION ONLY (OUTPATIENT)
Dept: CARE COORDINATION | Facility: CLINIC | Age: 40
End: 2020-01-13

## 2020-01-30 DIAGNOSIS — K75.4 AUTOIMMUNE HEPATITIS (H): Primary | ICD-10-CM

## 2020-02-10 ENCOUNTER — TELEPHONE (OUTPATIENT)
Dept: GASTROENTEROLOGY | Facility: CLINIC | Age: 40
End: 2020-02-10

## 2020-02-10 ENCOUNTER — PRE VISIT (OUTPATIENT)
Dept: GASTROENTEROLOGY | Facility: CLINIC | Age: 40
End: 2020-02-10

## 2020-02-10 NOTE — PROGRESS NOTES
Was the patient contacted by phone and reminded of the upcoming visit? message left    Was the patient instructed to bring a current list of all medications to the appointment or instructed to bring in all medication bottles? Yes     Was the patient instructed to arrive prior to the appointment time to have ordered labs drawn? Yes     Were the needed lab orders placed? Yes    Was lab appointment made 1 hour or more prior to visit? Yes    Patient instructed to arrive early for check-in    Sandra Hodge [facility]  2/10/2020 12:08 PM

## 2020-02-10 NOTE — TELEPHONE ENCOUNTER
Labs not needed. Patient is aware.    Jaylyn RIVERS LPN  Hepatology Clinic      [facility] Call Center    Phone Message    May a detailed message be left on voicemail: yes     Reason for Call: Patient received a VM from Sandra and he is questioning why he is scheduled for labs tomorrow since he completed labs at [facility] on 12/19/19 for Dr. Price. Caller questioning if labs are still needed since he only needs to do labs every 3 months. Patient requesting a call back to discuss since his appt is early tomorrow morning.    Please advise.  Thank you, Yamilet Gibbs on 2/10/2020 at 1:36 PM     Action Taken: Message routed to:  [facility]Y    Travel Screening: Not Applicable

## 2020-02-11 ENCOUNTER — TELEPHONE (OUTPATIENT)
Dept: GASTROENTEROLOGY | Facility: CLINIC | Age: 40
End: 2020-02-11

## 2020-02-11 ENCOUNTER — OFFICE VISIT (OUTPATIENT)
Dept: GASTROENTEROLOGY | Facility: CLINIC | Age: 40
End: 2020-02-11
Attending: INTERNAL MEDICINE
Payer: COMMERCIAL

## 2020-02-11 VITALS
TEMPERATURE: 97.4 F | WEIGHT: 140.74 LBS | HEIGHT: 68 IN | HEART RATE: 77 BPM | SYSTOLIC BLOOD PRESSURE: 138 MMHG | BODY MASS INDEX: 21.33 KG/M2 | OXYGEN SATURATION: 100 % | RESPIRATION RATE: 18 BRPM | DIASTOLIC BLOOD PRESSURE: 86 MMHG

## 2020-02-11 DIAGNOSIS — R94.5 ABNORMAL RESULTS OF LIVER FUNCTION STUDIES: ICD-10-CM

## 2020-02-11 DIAGNOSIS — K75.4 AUTOIMMUNE HEPATITIS (H): ICD-10-CM

## 2020-02-11 PROCEDURE — G0463 HOSPITAL OUTPT CLINIC VISIT: HCPCS | Mod: ZF

## 2020-02-11 RX ORDER — AZATHIOPRINE 50 MG/1
TABLET ORAL
Qty: 135 TABLET | Refills: 3 | Status: SHIPPED | OUTPATIENT
Start: 2020-02-11 | End: 2021-01-22

## 2020-02-11 RX ORDER — URSODIOL 300 MG/1
CAPSULE ORAL
Qty: 180 CAPSULE | Refills: 3 | Status: SHIPPED | OUTPATIENT
Start: 2020-02-11 | End: 2021-01-22

## 2020-02-11 ASSESSMENT — PAIN SCALES - GENERAL: PAINLEVEL: NO PAIN (0)

## 2020-02-11 ASSESSMENT — MIFFLIN-ST. JEOR: SCORE: 1537.89

## 2020-02-11 NOTE — NURSING NOTE
"Chief Complaint   Patient presents with     RECHECK     autoimmune hepatitis        Vital signs:  Temp: 97.4  F (36.3  C) Temp src: Oral BP: 138/86 Pulse: 77   Resp: 18 SpO2: 100 %     Height: 172.7 cm (5' 8\") Weight: 63.8 kg (140 lb 11.8 oz)  Estimated body mass index is 21.4 kg/m  as calculated from the following:    Height as of this encounter: 1.727 m (5' 8\").    Weight as of this encounter: 63.8 kg (140 lb 11.8 oz).          Sandra Hodge, [facility]  2/11/2020 9:04 AM      "

## 2020-02-11 NOTE — LETTER
2/11/2020     RE: Kiel Davidson  [address] [facility] [address]     Dear Colleague,    Thank you for referring your patient, Keil Davidson, to the [facility] HEPATOLOGY at [facility]. Please see a copy of my visit note below.    [facility] Clinic follow up      A/P  37 year old yo male with AIH cirrhosis. Cirrhosis well compensated.      Continue Imuran 75 mg po daily, eligio 300 bid. Labs every 3 mo. Medications renewed.  HCC screening due for US in June. Ordered  EGD 2/15/17. Due. Ordered   Bone density. Due. Ordered     RTC 12 mo  This was a 30 minute visit, over 50% counseling and coordination of care.     Elle Price MD  Hepatology/Liver Transplant  Medical Director, Liver Transplantation  [facility]  ========================================================================  S:  39 year old you male with AIH. He was diagnosed with cirrhosis of unknown etiology in 2016. Then liver tests became markedly elevated in July 2017: , , Tbili 5, Aphos 343. He was tested for viral etiologies. 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 There was a moderately dense inflammatory infiltrate of lymphocytes, macrophages, eosinophiles and plasma cells. Autoimmune hepatitis could not be excluded, but it was not classic for it. He was tested for antibodies in 2015 and 2014, which were negative.    He was started on prednisone and Imuran. He responded well to this.. He is on Imuran 75 mg. His liver tests have been in the normal range for over a year.     Had a febrile illness in December. Fever 102. Went away on its own     Lab Test 12/19/19  0829   PROTTOTAL 8.6   ALBUMIN 3.9   BILITOTAL 0.6   ALKPHOS 142   AST 32   ALT 31     Lab Test 12/19/19  0829   WBC 3.7*   RBC 4.30*   HGB 13.5   HCT 41.6   MCV 97   MCH 31.4   MCHC 32.5   RDW 14.8   *     HCC screening 12/6/19 no masses  EGD 2/15/17  "normal     MELD-Na score: 7 at 12/4/2018  7:36 AM  MELD score: 7 at 12/4/2018  7:36 AM  Calculated from:  Serum Creatinine: 0.94 mg/dL (Rounded to 1) at 12/4/2018  7:36 AM  Serum Sodium: 139 mmol/L (Rounded to 137) at 12/4/2018  7:36 AM  Total Bilirubin: 0.8 mg/dL (Rounded to 1) at 12/4/2018  7:36 AM  INR(ratio): 1.13 at 12/4/2018  7:36 AM  Age: 37 years     Doing well.  No new health problems. No new family history.     SHx: Twins Erin and Jonathan are 18 months old.     O:  /86 (BP Location: Right arm, Patient Position: Sitting, Cuff Size: Adult Regular)   Pulse 77   Temp 97.4  F (36.3  C) (Oral)   Resp 18   Ht 1.727 m (5' 8\")   Wt 63.8 kg (140 lb 11.8 oz)   SpO2 100%   BMI 21.40 kg/m       Gen: No acute distress  Head: Normocephalic atraumatic  Eyes: Sclera anicteric  Neck: No cervical lymphadenopathy  Respiratory: Clear to auscultation bilaterally, no overt wheezing or rales  CV: RRR   Abdomen:  Soft, nontender, nondistended, normal bowel sounds  Extrem: No edema  Skin: No jaundice, spider angiomata or palmar erythema.  No rashes.   Neuro: Alert and oriented. No asterixis.    MSK: Grossly Intact  Psych: Normal speech, normal affect    Again, thank you for allowing me to participate in the care of your patient.      Sincerely,    Elle Price MD      "

## 2020-02-11 NOTE — PROGRESS NOTES
[facility] Liver Clinic follow up      A/P  37 year old yo male with AIH cirrhosis. Cirrhosis well compensated.      Continue Imuran 75 mg po daily, eligio 300 bid. Labs every 3 mo. Medications renewed.  HCC screening due for US in June. Ordered  EGD 2/15/17. Due. Ordered   Bone density. Due. Ordered     RTC 12 mo  This was a 30 minute visit, over 50% counseling and coordination of care.     Elle Price MD  Hepatology/Liver Transplant  Medical Director, Liver Transplantation  [facility]  ========================================================================  S:  39 year old you male with AIH. He was diagnosed with cirrhosis of unknown etiology in 2016. Then liver tests became markedly elevated in July 2017: , , Tbili 5, Aphos 343. He was tested for viral etiologies. 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 There was a moderately dense inflammatory infiltrate of lymphocytes, macrophages, eosinophiles and plasma cells. Autoimmune hepatitis could not be excluded, but it was not classic for it. He was tested for antibodies in 2015 and 2014, which were negative.    He was started on prednisone and Imuran. He responded well to this.. He is on Imuran 75 mg. His liver tests have been in the normal range for over a year.     Had a febrile illness in December. Fever 102. Went away on its own     Lab Test 12/19/19  0829   PROTTOTAL 8.6   ALBUMIN 3.9   BILITOTAL 0.6   ALKPHOS 142   AST 32   ALT 31     Lab Test 12/19/19  0829   WBC 3.7*   RBC 4.30*   HGB 13.5   HCT 41.6   MCV 97   MCH 31.4   MCHC 32.5   RDW 14.8   *     HCC screening 12/6/19 no masses  EGD 2/15/17 normal     MELD-Na score: 7 at 12/4/2018  7:36 AM  MELD score: 7 at 12/4/2018  7:36 AM  Calculated from:  Serum Creatinine: 0.94 mg/dL (Rounded to 1) at 12/4/2018  7:36 AM  Serum Sodium: 139 mmol/L (Rounded to 137) at 12/4/2018  7:36 AM  Total Bilirubin: 0.8 mg/dL (Rounded to  "1) at 12/4/2018  7:36 AM  INR(ratio): 1.13 at 12/4/2018  7:36 AM  Age: 37 years     Doing well.  No new health problems. No new family history.     SHx: Twins Erin and Jonathan are 18 months old.     O:  /86 (BP Location: Right arm, Patient Position: Sitting, Cuff Size: Adult Regular)   Pulse 77   Temp 97.4  F (36.3  C) (Oral)   Resp 18   Ht 1.727 m (5' 8\")   Wt 63.8 kg (140 lb 11.8 oz)   SpO2 100%   BMI 21.40 kg/m      Gen: No acute distress  Head: Normocephalic atraumatic  Eyes: Sclera anicteric  Neck: No cervical lymphadenopathy  Respiratory: Clear to auscultation bilaterally, no overt wheezing or rales  CV: RRR   Abdomen:  Soft, nontender, nondistended, normal bowel sounds  Extrem: No edema  Skin: No jaundice, spider angiomata or palmar erythema.  No rashes.   Neuro: Alert and oriented. No asterixis.    MSK: Grossly Intact  Psych: Normal speech, normal affect  "

## 2020-02-11 NOTE — LETTER
2/11/2020      RE: Kiel Davidson  [address] [place] [address]       [facility] Liver Clinic follow up      A/P  37 year old yo male with AIH cirrhosis. Cirrhosis well compensated.      Continue Imuran 75 mg po daily, eligio 300 bid. Labs every 3 mo. Medications renewed.  HCC screening due for US in June. Ordered  EGD 2/15/17. Due. Ordered   Bone density. Due. Ordered     RTC 12 mo  This was a 30 minute visit, over 50% counseling and coordination of care.     Elle Price MD  Hepatology/Liver Transplant  Medical Director, Liver Transplantation  [facility]  ========================================================================  S:  39 year old you male with AIH. He was diagnosed with cirrhosis of unknown etiology in 2016. Then liver tests became markedly elevated in July 2017: , , Tbili 5, Aphos 343. He was tested for viral etiologies. 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 There was a moderately dense inflammatory infiltrate of lymphocytes, macrophages, eosinophiles and plasma cells. Autoimmune hepatitis could not be excluded, but it was not classic for it. He was tested for antibodies in 2015 and 2014, which were negative.    He was started on prednisone and Imuran. He responded well to this.. He is on Imuran 75 mg. His liver tests have been in the normal range for over a year.     Had a febrile illness in December. Fever 102. Went away on its own     Lab Test 12/19/19  0829   PROTTOTAL 8.6   ALBUMIN 3.9   BILITOTAL 0.6   ALKPHOS 142   AST 32   ALT 31     Lab Test 12/19/19  0829   WBC 3.7*   RBC 4.30*   HGB 13.5   HCT 41.6   MCV 97   MCH 31.4   MCHC 32.5   RDW 14.8   *     HCC screening 12/6/19 no masses  EGD 2/15/17 normal     MELD-Na score: 7 at 12/4/2018  7:36 AM  MELD score: 7 at 12/4/2018  7:36 AM  Calculated from:  Serum Creatinine: 0.94 mg/dL (Rounded to 1) at 12/4/2018  7:36 AM  Serum Sodium: 139 mmol/L  "(Rounded to 137) at 12/4/2018  7:36 AM  Total Bilirubin: 0.8 mg/dL (Rounded to 1) at 12/4/2018  7:36 AM  INR(ratio): 1.13 at 12/4/2018  7:36 AM  Age: 37 years     Doing well.  No new health problems. No new family history.     SHx: Twins Erin and Jonathan are 18 months old.     O:  /86 (BP Location: Right arm, Patient Position: Sitting, Cuff Size: Adult Regular)   Pulse 77   Temp 97.4  F (36.3  C) (Oral)   Resp 18   Ht 1.727 m (5' 8\")   Wt 63.8 kg (140 lb 11.8 oz)   SpO2 100%   BMI 21.40 kg/m       Gen: No acute distress  Head: Normocephalic atraumatic  Eyes: Sclera anicteric  Neck: No cervical lymphadenopathy  Respiratory: Clear to auscultation bilaterally, no overt wheezing or rales  CV: RRR   Abdomen:  Soft, nontender, nondistended, normal bowel sounds  Extrem: No edema  Skin: No jaundice, spider angiomata or palmar erythema.  No rashes.   Neuro: Alert and oriented. No asterixis.    MSK: Grossly Intact  Psych: Normal speech, normal affect    Elle Price MD      "

## 2020-02-12 ENCOUNTER — TELEPHONE (OUTPATIENT)
Dept: GASTROENTEROLOGY | Facility: CLINIC | Age: 40
End: 2020-02-12

## 2020-02-13 ENCOUNTER — TELEPHONE (OUTPATIENT)
Dept: GASTROENTEROLOGY | Facility: CLINIC | Age: 40
End: 2020-02-13

## 2020-03-11 ENCOUNTER — HEALTH MAINTENANCE LETTER (OUTPATIENT)
Age: 40
End: 2020-03-11

## 2020-05-26 ENCOUNTER — TELEPHONE (OUTPATIENT)
Dept: GASTROENTEROLOGY | Facility: CLINIC | Age: 40
End: 2020-05-26

## 2020-05-26 ENCOUNTER — TRANSFERRED RECORDS (OUTPATIENT)
Dept: HEALTH INFORMATION MANAGEMENT | Facility: CLINIC | Age: 40
End: 2020-05-26

## 2020-05-26 NOTE — TELEPHONE ENCOUNTER
[facility] Call Center    Phone Message    May a detailed message be left on voicemail: yes     Reason for Call: Order(s): Other:   Reason for requested: Lab orders  Date needed: Asap  Provider name: Dr Elle Price MD    Please send labs to [facility] [phone]. Call patient when sent.       Action Taken: Message routed to:  [facility]  Hepatology    Travel Screening: Not Applicable

## 2020-05-29 ENCOUNTER — TELEPHONE (OUTPATIENT)
Dept: GASTROENTEROLOGY | Facility: CLINIC | Age: 40
End: 2020-05-29

## 2020-05-29 NOTE — TELEPHONE ENCOUNTER
[facility] Call Center    Phone Message    May a detailed message be left on voicemail: yes     Reason for Call: Order(s): Other:   Reason for requested: please sent pt lab orders to [facility] in [place] fax: [phone]  Date needed: asap  Provider name:     Requesting Results   Name/type of test: ultrasound  Date of test: 5/26  Was test done at a location other than [facility] (Please fill in the location if not [facility])?: Yes: health [facility]      Action Taken: Message routed to:  [facility] hep    Travel Screening: Not Applicable

## 2020-06-25 DIAGNOSIS — R94.5 ABNORMAL RESULTS OF LIVER FUNCTION STUDIES: ICD-10-CM

## 2020-06-25 DIAGNOSIS — K75.4 AUTOIMMUNE HEPATITIS (H): ICD-10-CM

## 2020-06-25 LAB
ALBUMIN SERPL-MCNC: 3.9 G/DL (ref 3.4–5)
ALP SERPL-CCNC: 130 U/L (ref 40–150)
ALT SERPL W P-5'-P-CCNC: 48 U/L (ref 0–70)
ANION GAP SERPL CALCULATED.3IONS-SCNC: 1 MMOL/L (ref 3–14)
AST SERPL W P-5'-P-CCNC: 43 U/L (ref 0–45)
BILIRUB DIRECT SERPL-MCNC: 0.2 MG/DL (ref 0–0.2)
BILIRUB SERPL-MCNC: 0.6 MG/DL (ref 0.2–1.3)
BUN SERPL-MCNC: 18 MG/DL (ref 7–30)
CALCIUM SERPL-MCNC: 9.3 MG/DL (ref 8.5–10.1)
CHLORIDE SERPL-SCNC: 106 MMOL/L (ref 94–109)
CO2 SERPL-SCNC: 31 MMOL/L (ref 20–32)
CREAT SERPL-MCNC: 0.95 MG/DL (ref 0.66–1.25)
ERYTHROCYTE [DISTWIDTH] IN BLOOD BY AUTOMATED COUNT: 13 % (ref 10–15)
GFR SERPL CREATININE-BSD FRML MDRD: >90 ML/MIN/{1.73_M2}
GLUCOSE SERPL-MCNC: 92 MG/DL (ref 70–99)
HCT VFR BLD AUTO: 41.9 % (ref 40–53)
HGB BLD-MCNC: 13.7 G/DL (ref 13.3–17.7)
INR PPP: 1.09 (ref 0.86–1.14)
MCH RBC QN AUTO: 32.7 PG (ref 26.5–33)
MCHC RBC AUTO-ENTMCNC: 32.7 G/DL (ref 31.5–36.5)
MCV RBC AUTO: 100 FL (ref 78–100)
PLATELET # BLD AUTO: 87 10E9/L (ref 150–450)
POTASSIUM SERPL-SCNC: 4.2 MMOL/L (ref 3.4–5.3)
PROT SERPL-MCNC: 8.2 G/DL (ref 6.8–8.8)
RBC # BLD AUTO: 4.19 10E12/L (ref 4.4–5.9)
SODIUM SERPL-SCNC: 138 MMOL/L (ref 133–144)
WBC # BLD AUTO: 3.9 10E9/L (ref 4–11)

## 2020-06-25 PROCEDURE — 80076 HEPATIC FUNCTION PANEL: CPT | Performed by: INTERNAL MEDICINE

## 2020-06-25 PROCEDURE — 36415 COLL VENOUS BLD VENIPUNCTURE: CPT | Performed by: INTERNAL MEDICINE

## 2020-06-25 PROCEDURE — 80048 BASIC METABOLIC PNL TOTAL CA: CPT | Performed by: INTERNAL MEDICINE

## 2020-06-25 PROCEDURE — 85027 COMPLETE CBC AUTOMATED: CPT | Performed by: INTERNAL MEDICINE

## 2020-06-25 PROCEDURE — 85610 PROTHROMBIN TIME: CPT | Performed by: INTERNAL MEDICINE

## 2020-10-20 DIAGNOSIS — Z11.59 ENCOUNTER FOR SCREENING FOR OTHER VIRAL DISEASES: Primary | ICD-10-CM

## 2020-11-02 DIAGNOSIS — Z11.59 ENCOUNTER FOR SCREENING FOR OTHER VIRAL DISEASES: ICD-10-CM

## 2020-11-02 LAB
SARS-COV-2 RNA SPEC QL NAA+PROBE: NOT DETECTED
SPECIMEN SOURCE: NORMAL

## 2020-11-06 ENCOUNTER — HOSPITAL ENCOUNTER (OUTPATIENT)
Facility: CLINIC | Age: 40
Discharge: HOME | End: 2020-11-06
Attending: INTERNAL MEDICINE | Admitting: INTERNAL MEDICINE
Payer: COMMERCIAL

## 2020-11-06 VITALS
HEIGHT: 68 IN | WEIGHT: 140 LBS | RESPIRATION RATE: 18 BRPM | HEART RATE: 73 BPM | DIASTOLIC BLOOD PRESSURE: 84 MMHG | SYSTOLIC BLOOD PRESSURE: 122 MMHG | OXYGEN SATURATION: 100 % | BODY MASS INDEX: 21.22 KG/M2

## 2020-11-06 LAB — UPPER GI ENDOSCOPY: NORMAL

## 2020-11-06 PROCEDURE — 250N000011 HC RX IP 250 OP 636: Performed by: INTERNAL MEDICINE

## 2020-11-06 PROCEDURE — 43235 EGD DIAGNOSTIC BRUSH WASH: CPT | Performed by: INTERNAL MEDICINE

## 2020-11-06 PROCEDURE — 999N000099 HC STATISTIC MODERATE SEDATION < 10 MIN: Performed by: INTERNAL MEDICINE

## 2020-11-06 PROCEDURE — 250N000009 HC RX 250: Performed by: INTERNAL MEDICINE

## 2020-11-06 PROCEDURE — G0500 MOD SEDAT ENDO SERVICE >5YRS: HCPCS | Performed by: INTERNAL MEDICINE

## 2020-11-06 RX ORDER — ONDANSETRON 4 MG/1
4 TABLET, ORALLY DISINTEGRATING ORAL EVERY 6 HOURS PRN
Status: CANCELLED | OUTPATIENT
Start: 2020-11-06

## 2020-11-06 RX ORDER — ONDANSETRON 2 MG/ML
4 INJECTION INTRAMUSCULAR; INTRAVENOUS
Status: CANCELLED | OUTPATIENT
Start: 2020-11-06

## 2020-11-06 RX ORDER — FENTANYL CITRATE 50 UG/ML
INJECTION, SOLUTION INTRAMUSCULAR; INTRAVENOUS PRN
Status: DISCONTINUED | OUTPATIENT
Start: 2020-11-06 | End: 2020-11-06 | Stop reason: HOSPADM

## 2020-11-06 RX ORDER — NALOXONE HYDROCHLORIDE 0.4 MG/ML
.1-.4 INJECTION, SOLUTION INTRAMUSCULAR; INTRAVENOUS; SUBCUTANEOUS
Status: CANCELLED | OUTPATIENT
Start: 2020-11-06 | End: 2020-11-07

## 2020-11-06 RX ORDER — LIDOCAINE 40 MG/G
CREAM TOPICAL
Status: CANCELLED | OUTPATIENT
Start: 2020-11-06

## 2020-11-06 RX ORDER — PROCHLORPERAZINE MALEATE 10 MG
10 TABLET ORAL EVERY 6 HOURS PRN
Status: CANCELLED | OUTPATIENT
Start: 2020-11-06

## 2020-11-06 RX ORDER — ONDANSETRON 2 MG/ML
4 INJECTION INTRAMUSCULAR; INTRAVENOUS EVERY 6 HOURS PRN
Status: CANCELLED | OUTPATIENT
Start: 2020-11-06

## 2020-11-06 RX ORDER — FLUMAZENIL 0.1 MG/ML
0.2 INJECTION, SOLUTION INTRAVENOUS
Status: CANCELLED | OUTPATIENT
Start: 2020-11-06 | End: 2020-11-06

## 2020-11-06 ASSESSMENT — MIFFLIN-ST. JEOR: SCORE: 1534.54

## 2020-11-18 ENCOUNTER — TELEPHONE (OUTPATIENT)
Dept: GASTROENTEROLOGY | Facility: CLINIC | Age: 40
End: 2020-11-18

## 2020-12-08 ENCOUNTER — TELEPHONE (OUTPATIENT)
Dept: GASTROENTEROLOGY | Facility: CLINIC | Age: 40
End: 2020-12-08

## 2020-12-08 NOTE — TELEPHONE ENCOUNTER
Labs orders are placed.    Jaylyn RIVERS LPN  Hepatology Clinic     [facility] Call Center    Phone Message    May a detailed message be left on voicemail: yes     Reason for Call: Order(s): Other:   Reason for requested: Pt is requesting for their lab orders to be sent to the [facility] in [place]  Date needed: asap  Provider name: Dr. Price      Action Taken: Message routed to:  [facility] hep    Travel Screening: Not Applicable

## 2020-12-29 DIAGNOSIS — R94.5 ABNORMAL RESULTS OF LIVER FUNCTION STUDIES: ICD-10-CM

## 2020-12-29 DIAGNOSIS — K75.4 AUTOIMMUNE HEPATITIS (H): ICD-10-CM

## 2020-12-29 LAB
ALBUMIN SERPL-MCNC: 4.2 G/DL (ref 3.4–5)
ALP SERPL-CCNC: 161 U/L (ref 40–150)
ALT SERPL W P-5'-P-CCNC: 25 U/L (ref 0–70)
ANION GAP SERPL CALCULATED.3IONS-SCNC: 3 MMOL/L (ref 3–14)
AST SERPL W P-5'-P-CCNC: 29 U/L (ref 0–45)
BILIRUB DIRECT SERPL-MCNC: 0.2 MG/DL (ref 0–0.2)
BILIRUB SERPL-MCNC: 0.6 MG/DL (ref 0.2–1.3)
BUN SERPL-MCNC: 16 MG/DL (ref 7–30)
CALCIUM SERPL-MCNC: 9.6 MG/DL (ref 8.5–10.1)
CHLORIDE SERPL-SCNC: 106 MMOL/L (ref 94–109)
CO2 SERPL-SCNC: 28 MMOL/L (ref 20–32)
CREAT SERPL-MCNC: 0.89 MG/DL (ref 0.66–1.25)
ERYTHROCYTE [DISTWIDTH] IN BLOOD BY AUTOMATED COUNT: 13.3 % (ref 10–15)
GFR SERPL CREATININE-BSD FRML MDRD: >90 ML/MIN/{1.73_M2}
GLUCOSE SERPL-MCNC: 91 MG/DL (ref 70–99)
HCT VFR BLD AUTO: 41.3 % (ref 40–53)
HGB BLD-MCNC: 13.4 G/DL (ref 13.3–17.7)
INR PPP: 1.04 (ref 0.86–1.14)
MCH RBC QN AUTO: 31.5 PG (ref 26.5–33)
MCHC RBC AUTO-ENTMCNC: 32.4 G/DL (ref 31.5–36.5)
MCV RBC AUTO: 97 FL (ref 78–100)
PLATELET # BLD AUTO: 106 10E9/L (ref 150–450)
POTASSIUM SERPL-SCNC: 4 MMOL/L (ref 3.4–5.3)
PROT SERPL-MCNC: 8.7 G/DL (ref 6.8–8.8)
RBC # BLD AUTO: 4.26 10E12/L (ref 4.4–5.9)
SODIUM SERPL-SCNC: 137 MMOL/L (ref 133–144)
WBC # BLD AUTO: 4.5 10E9/L (ref 4–11)

## 2020-12-29 PROCEDURE — 80076 HEPATIC FUNCTION PANEL: CPT | Performed by: INTERNAL MEDICINE

## 2020-12-29 PROCEDURE — 36415 COLL VENOUS BLD VENIPUNCTURE: CPT | Performed by: INTERNAL MEDICINE

## 2020-12-29 PROCEDURE — 85027 COMPLETE CBC AUTOMATED: CPT | Performed by: INTERNAL MEDICINE

## 2020-12-29 PROCEDURE — 85610 PROTHROMBIN TIME: CPT | Performed by: INTERNAL MEDICINE

## 2020-12-29 PROCEDURE — 80048 BASIC METABOLIC PNL TOTAL CA: CPT | Performed by: INTERNAL MEDICINE

## 2021-01-22 DIAGNOSIS — R94.5 ABNORMAL RESULTS OF LIVER FUNCTION STUDIES: ICD-10-CM

## 2021-01-22 DIAGNOSIS — K75.4 AUTOIMMUNE HEPATITIS (H): ICD-10-CM

## 2021-01-22 RX ORDER — URSODIOL 300 MG/1
CAPSULE ORAL
Qty: 180 CAPSULE | Refills: 3 | Status: SHIPPED | OUTPATIENT
Start: 2021-01-22 | End: 2021-02-12

## 2021-01-22 RX ORDER — AZATHIOPRINE 50 MG/1
TABLET ORAL
Qty: 135 TABLET | Refills: 3 | Status: SHIPPED | OUTPATIENT
Start: 2021-01-22 | End: 2021-02-12

## 2021-02-12 ENCOUNTER — VIRTUAL VISIT (OUTPATIENT)
Dept: GASTROENTEROLOGY | Facility: CLINIC | Age: 41
End: 2021-02-12
Attending: INTERNAL MEDICINE
Payer: COMMERCIAL

## 2021-02-12 DIAGNOSIS — R94.5 ABNORMAL RESULTS OF LIVER FUNCTION STUDIES: ICD-10-CM

## 2021-02-12 DIAGNOSIS — K75.4 AUTOIMMUNE HEPATITIS (H): ICD-10-CM

## 2021-02-12 PROCEDURE — 99214 OFFICE O/P EST MOD 30 MIN: CPT | Mod: GT | Performed by: INTERNAL MEDICINE

## 2021-02-12 RX ORDER — AZATHIOPRINE 50 MG/1
TABLET ORAL
Qty: 135 TABLET | Refills: 3 | Status: SHIPPED | OUTPATIENT
Start: 2021-02-12 | End: 2022-01-20

## 2021-02-12 RX ORDER — URSODIOL 300 MG/1
CAPSULE ORAL
Qty: 180 CAPSULE | Refills: 3 | Status: SHIPPED | OUTPATIENT
Start: 2021-02-12 | End: 2021-06-30

## 2021-02-12 NOTE — PROGRESS NOTES
[facility] Liver Clinic follow up      A/P  Mr. Davidson 40 Y M with AIH cirrhosis. Cirrhosis well compensated.      Continue Imuran 75 mg po daily, eligio 300 bid. Labs every 3 mo. Medications renewed.  HCC screening due for US in June. Ordered  Variceal screening EGD 11/6/20 Due 2023   Bone density Due. Ordered  Proph Discussed rec for flu and Covid vaccines    Outside labs and US reveiwed.     RTC 12 mo  This was a 30 minute visit, over 50% counseling and coordination of care.     Elle Price MD  Hepatology/Liver Transplant  Medical Director, Liver Transplantation  [facility]  ========================================================================  S:   Mr. Davidson is a 40 Y M with AIH. He was diagnosed with cirrhosis of unknown etiology in 2016. Then liver tests became markedly elevated in July 2017: , , Tbili 5, Aphos 343. He was tested for viral etiologies. 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 There was a moderately dense inflammatory infiltrate of lymphocytes, macrophages, eosinophiles and plasma cells. Autoimmune hepatitis could not be excluded, but it was not classic for it. He was tested for antibodies in 2015 and 2014, which were negative.    He was started on prednisone and Imuran. He responded well to this. He is on Imuran 75 mg. Also is on eligio. His liver tests have been in the normal range for over 2 years.    His health has been good. He got the flu shot. Waiting for Covid vaccination.     Doing well.  No new health problems. No new family history.    Lab Test 12/29/20  1247   PROTTOTAL 8.7   ALBUMIN 4.2   BILITOTAL 0.6   ALKPHOS 161*   AST 29   ALT 25     CBC RESULTS:   Recent Labs   Lab Test 12/29/20  1247   WBC 4.5   RBC 4.26*   HGB 13.4   HCT 41.3   MCV 97   MCH 31.5   MCHC 32.4   RDW 13.3   *         SHx: Twins Erin and Jonathan are 2 1/2     Exam  Gen Alert pleasant NAD  Resp No difficulty breathing.  "No cough  Skin No Jaundice  Eyes No icterus  Neuro HOOPER  MSK no muscle wasting  Psyche Pleasant, appropriate. Well groomed.    Kiel Davidson is a 40 year old male who is being evaluated via a billable video visit.      The patient has been notified of following:   \"This video visit will be conducted via a call between you and your physician/provider. We have found that certain health care needs can be provided without the need for an in-person physical exam.  This service lets us provide the care you need with a video conversation.  If a prescription is necessary we can send it directly to your pharmacy.  If lab work is needed we can place an order for that and you can then stop by our lab to have the test done at a later time. Video visits are billed at different rates depending on your insurance coverage.  Please reach out to your insurance provider with any questions.  If during the course of the call the physician/provider feels a video visit is not appropriate, you will not be charged for this service.\"   Patient has given verbal consent for Video visit? Yes      Type of service:  Video Visit  Video Start Time:   Video End Time  Patient location: home  Will anyone else be joining your video visit?   {If patient encounters technical issues they should call [phone] :1  Distant Location (provider location):  [facility]PATOLOGY [facility]IN[facility] [place]   Mode of Communication:  Video Conference via [employer]  I have reviewed and updated the patient's Past Medical History, Social History, Family History and Medication List.          "

## 2021-02-12 NOTE — PROGRESS NOTES
"Kiel is a 40 year old who is being evaluated via a billable video visit.      How would you like to obtain your AVS? MyChart    Will anyone else be joining your video visit? No  {If patient encounters technical issues they should call [phone] :[id number]}    Video Start Time: {video visit start/end time for provider to select:918402}  Video-Visit Details    Type of service:  Video Visit    Video End Time:{video visit start/end time for provider to select:801858}    Originating Location (pt. Location): {video visit patient location:933047::\"Home\"}    Distant Location (provider location):  [facility]PATOLOG[facility] [place]     Platform used for Video Visit: {Virtual Visit Platforms:894515::\"[employer]\"}    "

## 2021-02-12 NOTE — LETTER
2/12/2021         RE: Keil Davidson  [address] [facility] [address]        Dear Colleague,    Thank you for referring your patient, Kiel Davidson, to the [facility]PATOLOGY CLINIC [place]. Please see a copy of my visit note below.    [facility] follow up      A/P  Mr. Davidson 40 Y M with AIH cirrhosis. Cirrhosis well compensated.      Continue Imuran 75 mg po daily, eligio 300 bid. Labs every 3 mo. Medications renewed.  HCC screening due for US in June. Ordered  Variceal screening EGD 11/6/20 Due 2023   Bone density Due. Ordered  Proph Discussed rec for flu and Covid vaccines    Outside labs and US reveiwed.     RTC 12 mo  This was a 30 minute visit, over 50% counseling and coordination of care.     Elle Price MD  Hepatology/Liver Transplant  Medical Director, Liver Transplantation  [facility]  ========================================================================  S:   Mr. Davidson is a 40 Y M with AIH. He was diagnosed with cirrhosis of unknown etiology in 2016. Then liver tests became markedly elevated in July 2017: , , Tbili 5, Aphos 343. He was tested for viral etiologies. 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 There was a moderately dense inflammatory infiltrate of lymphocytes, macrophages, eosinophiles and plasma cells. Autoimmune hepatitis could not be excluded, but it was not classic for it. He was tested for antibodies in 2015 and 2014, which were negative.    He was started on prednisone and Imuran. He responded well to this. He is on Imuran 75 mg. Also is on eligio. His liver tests have been in the normal range for over 2 years.    His health has been good. He got the flu shot. Waiting for Covid vaccination.     Doing well.  No new health problems. No new family history.    Lab Test 12/29/20  1247   PROTTOTAL 8.7   ALBUMIN 4.2   BILITOTAL 0.6   ALKPHOS 161*   AST 29   ALT 25  "    CBC RESULTS:   Recent Labs   Lab Test 12/29/20  1247   WBC 4.5   RBC 4.26*   HGB 13.4   HCT 41.3   MCV 97   MCH 31.5   MCHC 32.4   RDW 13.3   *         SHx: Twins Erin and Jonathan are 2 1/2     Exam  Gen Alert pleasant NAD  Resp No difficulty breathing. No cough  Skin No Jaundice  Eyes No icterus  Neuro HOOPER  MSK no muscle wasting  Psyche Pleasant, appropriate. Well groomed.    Kiel Davidson is a 40 year old male who is being evaluated via a billable video visit.      The patient has been notified of following:   \"This video visit will be conducted via a call between you and your physician/provider. We have found that certain health care needs can be provided without the need for an in-person physical exam.  This service lets us provide the care you need with a video conversation.  If a prescription is necessary we can send it directly to your pharmacy.  If lab work is needed we can place an order for that and you can then stop by our lab to have the test done at a later time. Video visits are billed at different rates depending on your insurance coverage.  Please reach out to your insurance provider with any questions.  If during the course of the call the physician/provider feels a video visit is not appropriate, you will not be charged for this service.\"   Patient has given verbal consent for Video visit? Yes      Type of service:  Video Visit  Video Start Time:   Video End Time  Patient location: home  Will anyone else be joining your video visit?   {If patient encounters technical issues they should call [phone] :1  Distant Location (provider location):  [facility]PATOLOGY [facility]INIC [place]   Mode of Communication:  Video Conference via [employer] [place]  I have reviewed and updated the patient's Past Medical History, Social History, Family History and Medication List.      Again, thank you for allowing me to participate in the care of your patient.        Sincerely,        Elle Peterson" MD [place]

## 2021-04-25 ENCOUNTER — HEALTH MAINTENANCE LETTER (OUTPATIENT)
Age: 41
End: 2021-04-25

## 2021-06-29 DIAGNOSIS — K75.4 AUTOIMMUNE HEPATITIS (H): ICD-10-CM

## 2021-06-29 DIAGNOSIS — R94.5 ABNORMAL RESULTS OF LIVER FUNCTION STUDIES: ICD-10-CM

## 2021-06-29 LAB
ERYTHROCYTE [DISTWIDTH] IN BLOOD BY AUTOMATED COUNT: 12.9 % (ref 10–15)
HCT VFR BLD AUTO: 41.9 % (ref 40–53)
HGB BLD-MCNC: 13.5 G/DL (ref 13.3–17.7)
INR PPP: 1.08 (ref 0.86–1.14)
MCH RBC QN AUTO: 31.7 PG (ref 26.5–33)
MCHC RBC AUTO-ENTMCNC: 32.2 G/DL (ref 31.5–36.5)
MCV RBC AUTO: 98 FL (ref 78–100)
PLATELET # BLD AUTO: 93 10E9/L (ref 150–450)
RBC # BLD AUTO: 4.26 10E12/L (ref 4.4–5.9)
WBC # BLD AUTO: 3.9 10E9/L (ref 4–11)

## 2021-06-29 PROCEDURE — 85027 COMPLETE CBC AUTOMATED: CPT | Performed by: INTERNAL MEDICINE

## 2021-06-29 PROCEDURE — 80048 BASIC METABOLIC PNL TOTAL CA: CPT | Performed by: INTERNAL MEDICINE

## 2021-06-29 PROCEDURE — 36415 COLL VENOUS BLD VENIPUNCTURE: CPT | Performed by: INTERNAL MEDICINE

## 2021-06-29 PROCEDURE — 85610 PROTHROMBIN TIME: CPT | Performed by: INTERNAL MEDICINE

## 2021-06-29 PROCEDURE — 80076 HEPATIC FUNCTION PANEL: CPT | Performed by: INTERNAL MEDICINE

## 2021-06-30 ENCOUNTER — TELEPHONE (OUTPATIENT)
Dept: GASTROENTEROLOGY | Facility: CLINIC | Age: 41
End: 2021-06-30

## 2021-06-30 DIAGNOSIS — R94.5 ABNORMAL RESULTS OF LIVER FUNCTION STUDIES: ICD-10-CM

## 2021-06-30 DIAGNOSIS — K75.4 AUTOIMMUNE HEPATITIS (H): ICD-10-CM

## 2021-06-30 LAB
ALBUMIN SERPL-MCNC: 4.1 G/DL (ref 3.4–5)
ALP SERPL-CCNC: 187 U/L (ref 40–150)
ALT SERPL W P-5'-P-CCNC: 25 U/L (ref 0–70)
ANION GAP SERPL CALCULATED.3IONS-SCNC: 2 MMOL/L (ref 3–14)
AST SERPL W P-5'-P-CCNC: 27 U/L (ref 0–45)
BILIRUB DIRECT SERPL-MCNC: 0.2 MG/DL (ref 0–0.2)
BILIRUB SERPL-MCNC: 0.5 MG/DL (ref 0.2–1.3)
BUN SERPL-MCNC: 13 MG/DL (ref 7–30)
CALCIUM SERPL-MCNC: 8.9 MG/DL (ref 8.5–10.1)
CHLORIDE SERPL-SCNC: 106 MMOL/L (ref 94–109)
CO2 SERPL-SCNC: 30 MMOL/L (ref 20–32)
CREAT SERPL-MCNC: 0.97 MG/DL (ref 0.66–1.25)
GFR SERPL CREATININE-BSD FRML MDRD: >90 ML/MIN/{1.73_M2}
GLUCOSE SERPL-MCNC: 80 MG/DL (ref 70–99)
POTASSIUM SERPL-SCNC: 4.1 MMOL/L (ref 3.4–5.3)
PROT SERPL-MCNC: 8.2 G/DL (ref 6.8–8.8)
SODIUM SERPL-SCNC: 138 MMOL/L (ref 133–144)

## 2021-06-30 RX ORDER — URSODIOL 300 MG/1
CAPSULE ORAL
Qty: 180 CAPSULE | Refills: 3 | Status: SHIPPED | OUTPATIENT
Start: 2021-06-30 | End: 2022-02-11

## 2021-06-30 NOTE — TELEPHONE ENCOUNTER
Returned patient's call.      Per Dr. Price:  Ultrasound shows changes consistent with known cirrhosis.  No evidence for any early liver cancer.     Jaylyn RIVERS LPN  Hepatology Clinic       [facility]    Phone Message    May a detailed message be left on voicemail: yes     Reason for Call: Other: Patient is requesting a call back to discuss if Dr. Price received the test results from [place], please call patient at [phone] to advise.     Action Taken: Message routed to:  [facility]    Travel Screening: Not Applicable

## 2021-11-08 ENCOUNTER — LAB (OUTPATIENT)
Dept: LAB | Facility: CLINIC | Age: 41
End: 2021-11-08
Payer: COMMERCIAL

## 2021-11-08 DIAGNOSIS — R94.5 ABNORMAL RESULTS OF LIVER FUNCTION STUDIES: ICD-10-CM

## 2021-11-08 DIAGNOSIS — K75.4 AUTOIMMUNE HEPATITIS (H): ICD-10-CM

## 2021-11-08 LAB
ALBUMIN SERPL-MCNC: 4.2 G/DL (ref 3.4–5)
ALP SERPL-CCNC: 177 U/L (ref 40–150)
ALT SERPL W P-5'-P-CCNC: 23 U/L (ref 0–70)
AST SERPL W P-5'-P-CCNC: 26 U/L (ref 0–45)
BILIRUB DIRECT SERPL-MCNC: 0.3 MG/DL (ref 0–0.2)
BILIRUB SERPL-MCNC: 0.7 MG/DL (ref 0.2–1.3)
ERYTHROCYTE [DISTWIDTH] IN BLOOD BY AUTOMATED COUNT: 12.7 % (ref 10–15)
HCT VFR BLD AUTO: 45.2 % (ref 40–53)
HGB BLD-MCNC: 14.6 G/DL (ref 13.3–17.7)
MCH RBC QN AUTO: 31.3 PG (ref 26.5–33)
MCHC RBC AUTO-ENTMCNC: 32.3 G/DL (ref 31.5–36.5)
MCV RBC AUTO: 97 FL (ref 78–100)
PLATELET # BLD AUTO: 114 10E3/UL (ref 150–450)
PROT SERPL-MCNC: 8.9 G/DL (ref 6.8–8.8)
RBC # BLD AUTO: 4.66 10E6/UL (ref 4.4–5.9)
WBC # BLD AUTO: 5.7 10E3/UL (ref 4–11)

## 2021-11-08 PROCEDURE — 80076 HEPATIC FUNCTION PANEL: CPT

## 2021-11-08 PROCEDURE — 85027 COMPLETE CBC AUTOMATED: CPT

## 2021-11-08 PROCEDURE — 36415 COLL VENOUS BLD VENIPUNCTURE: CPT

## 2022-01-20 ENCOUNTER — TELEPHONE (OUTPATIENT)
Dept: GASTROENTEROLOGY | Facility: CLINIC | Age: 42
End: 2022-01-20
Payer: COMMERCIAL

## 2022-01-20 DIAGNOSIS — K75.4 AUTOIMMUNE HEPATITIS (H): ICD-10-CM

## 2022-01-20 DIAGNOSIS — R94.5 ABNORMAL RESULTS OF LIVER FUNCTION STUDIES: ICD-10-CM

## 2022-01-20 RX ORDER — AZATHIOPRINE 50 MG/1
TABLET ORAL
Qty: 135 TABLET | Refills: 3 | Status: SHIPPED | OUTPATIENT
Start: 2022-01-20 | End: 2022-02-11

## 2022-01-20 NOTE — TELEPHONE ENCOUNTER
"Ultrasound looks fine. The findings described are expected with cirrhosis. Radiologists simply have their own style in describing some of the same things. \"Coarsened hepatic echotexture\" is seen in cirrhosis. \"Parenchymal disease\" means liver tissue diease (ie cirrhosis).    No other concerning findings.     Repeat US for routine screening in 6 months.    Message sent to pt.  "

## 2022-01-20 NOTE — TELEPHONE ENCOUNTER
Azathioprine 50 mg tab refilled.    Thanks,    Jaylyn RIVERS LPN  Hepatology Clinic        [facility] Call Center    Phone Message    May a detailed message be left on voicemail: yes     Reason for Call: Medication Refill Request    Has the patient contacted the pharmacy for the refill? Yes   Name of medication being requested: azaTHIOprine (IMURAN) 50 MG tablet  Provider who prescribed the medication: Elle Price MD  Pharmacy: [facility] [address]  Date medication is needed: 01/25/22      Pt Stated the pharmacy submitted a refill request and is awaiting approval.    Action Taken: Message routed to:  [facility] Hep    Travel Screening: Not Applicable

## 2022-01-20 NOTE — TELEPHONE ENCOUNTER
[facility] Call Center    Phone Message    May a detailed message be left on voicemail: yes     Reason for Call: Other:      Pt is requesting a call back to discuss his the results of his ultra sound.    Action Taken: Message routed to:  [facility]): Heptaology    Travel Screening: Not Applicable

## 2022-02-10 ENCOUNTER — LAB (OUTPATIENT)
Dept: LAB | Facility: CLINIC | Age: 42
End: 2022-02-10
Payer: COMMERCIAL

## 2022-02-10 DIAGNOSIS — R94.5 ABNORMAL RESULTS OF LIVER FUNCTION STUDIES: ICD-10-CM

## 2022-02-10 DIAGNOSIS — K75.4 AUTOIMMUNE HEPATITIS (H): ICD-10-CM

## 2022-02-10 LAB
ALBUMIN SERPL-MCNC: 4.1 G/DL (ref 3.4–5)
ALP SERPL-CCNC: 185 U/L (ref 40–150)
ALT SERPL W P-5'-P-CCNC: 29 U/L (ref 0–70)
AST SERPL W P-5'-P-CCNC: 30 U/L (ref 0–45)
BILIRUB DIRECT SERPL-MCNC: 0.2 MG/DL (ref 0–0.2)
BILIRUB SERPL-MCNC: 0.8 MG/DL (ref 0.2–1.3)
ERYTHROCYTE [DISTWIDTH] IN BLOOD BY AUTOMATED COUNT: 13.1 % (ref 10–15)
HCT VFR BLD AUTO: 45.2 % (ref 40–53)
HGB BLD-MCNC: 14.4 G/DL (ref 13.3–17.7)
MCH RBC QN AUTO: 31.2 PG (ref 26.5–33)
MCHC RBC AUTO-ENTMCNC: 31.9 G/DL (ref 31.5–36.5)
MCV RBC AUTO: 98 FL (ref 78–100)
PLATELET # BLD AUTO: 120 10E3/UL (ref 150–450)
PROT SERPL-MCNC: 9.1 G/DL (ref 6.8–8.8)
RBC # BLD AUTO: 4.61 10E6/UL (ref 4.4–5.9)
WBC # BLD AUTO: 4.8 10E3/UL (ref 4–11)

## 2022-02-10 PROCEDURE — 36415 COLL VENOUS BLD VENIPUNCTURE: CPT

## 2022-02-10 PROCEDURE — 80076 HEPATIC FUNCTION PANEL: CPT

## 2022-02-10 PROCEDURE — 85027 COMPLETE CBC AUTOMATED: CPT

## 2022-02-11 ENCOUNTER — VIRTUAL VISIT (OUTPATIENT)
Dept: GASTROENTEROLOGY | Facility: CLINIC | Age: 42
End: 2022-02-11
Attending: INTERNAL MEDICINE
Payer: COMMERCIAL

## 2022-02-11 DIAGNOSIS — R94.5 ABNORMAL RESULTS OF LIVER FUNCTION STUDIES: ICD-10-CM

## 2022-02-11 DIAGNOSIS — K75.4 AUTOIMMUNE HEPATITIS (H): Primary | ICD-10-CM

## 2022-02-11 PROCEDURE — 99214 OFFICE O/P EST MOD 30 MIN: CPT | Mod: GT | Performed by: INTERNAL MEDICINE

## 2022-02-11 RX ORDER — URSODIOL 300 MG/1
CAPSULE ORAL
Qty: 180 CAPSULE | Refills: 3 | Status: SHIPPED | OUTPATIENT
Start: 2022-02-11 | End: 2022-11-07

## 2022-02-11 RX ORDER — AZATHIOPRINE 50 MG/1
TABLET ORAL
Qty: 135 TABLET | Refills: 3 | Status: SHIPPED | OUTPATIENT
Start: 2022-02-11 | End: 2023-04-12

## 2022-02-11 NOTE — LETTER
2/11/2022     RE: Kiel Davidson  [address] [facility] [address]    Dear Colleague,    Thank you for referring your patient, Kiel Davidson, to the [facility] HEPATOLOGY CLINIC [place]. Please see a copy of my visit note below.    [facility] follow up      A/P  Mr. Davidson 40 Y M with AIH cirrhosis. Cirrhosis well compensated. Noted that AP and TP have increased very slightly. Will continue to watch this.     Continue Imuran 75 mg po daily, eligio 600/300. Labs every 3 mo. Medications renewed.     HCC screening US 1/17/22 no mass. Repeat 6 mo. Ordered  Variceal screening EGD 11/6/20 Due 2023   Bone density Normal in 2017. Ordered  Proph has had Covid vaccine    Outside labs and US reveiwed.     RTC 1 y     Elle Price MD  Hepatology/Liver Transplant  Medical Director, Liver Transplantation  [facility]  ========================================================================  S:   Mr. Davidson is a 41 Y M with AIH. He was diagnosed with cirrhosis of unknown etiology in 2016.    He is doing well. Working from home. 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    Then liver tests became markedly elevated in July 2017: , , Tbili 5, Aphos 343. He was tested for viral etiologies. 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 There was a moderately dense inflammatory infiltrate of lymphocytes, macrophages, eosinophils and plasma cells. Autoimmune hepatitis could not be excluded, but it was not classic for it. He was tested for antibodies in 2015 and 2014, which were negative.    He was started on prednisone and Imuran at that time. He responded well to this. He is on Imuran 75 mg and eligio 600/300.         Lab Test 02/10/22  1155   PROTTOTAL 9.1*   ALBUMIN 4.1   BILITOTAL 0.8   ALKPHOS 185*   AST 30   ALT 29     Lab Test 02/10/22  1155   WBC 4.8   RBC 4.61   HGB 14.4   HCT 45.2   MCV 98   MCH 31.2   MCHC 31.9  "  RDW 13.1   *     No new health problems. No new family history.    SHx: Twins Erin and Jonathan are 3 1/2     Exam  Gen Alert pleasant NAD  Resp No difficulty breathing. No cough  Skin No Jaundice  Eyes No icterus  Neuro HOOPER  MSK no muscle wasting  Psyche Pleasant, appropriate. Well groomed.    Kiel Davidson is a 41 year old male who is being evaluated via a billable video visit.      The patient has been notified of following:   \"This video visit will be conducted via a call between you and your physician/provider. We have found that certain health care needs can be provided without the need for an in-person physical exam.  This service lets us provide the care you need with a video conversation.  If a prescription is necessary we can send it directly to your pharmacy.  If lab work is needed we can place an order for that and you can then stop by our lab to have the test done at a later time. Video visits are billed at different rates depending on your insurance coverage.  Please reach out to your insurance provider with any questions.  If during the course of the call the physician/provider feels a video visit is not appropriate, you will not be charged for this service.\"   Patient has given verbal consent for Video visit? Yes      Type of service:  Video Visit  Video Start Time: 1006  Video End Time  Patient location: home  Will anyone else be joining your video visit?   {If patient encounters technical issues they should call [phone] :1  Distant Location (provider location):  [facility] HEPATOLOGY CLINIC [place]   Mode of Communication:  Video Conference via [employer]  I have reviewed and updated the patient's Past Medical History, Social History, Family History and Medication List.  Again, thank you for allowing me to participate in the care of your patient.      Sincerely,    Elle Price MD  "

## 2022-02-11 NOTE — PROGRESS NOTES
[facility] Liver Clinic follow up      A/P  Mr. Davidson 40 Y M with AIH cirrhosis. Cirrhosis well compensated. Noted that AP and TP have increased very slightly. Will continue to watch this.     Continue Imuran 75 mg po daily, eligio 600/300. Labs every 3 mo. Medications renewed.     HCC screening US 1/17/22 no mass. Repeat 6 mo. Ordered  Variceal screening EGD 11/6/20 Due 2023   Bone density Normal in 2017. Ordered  Proph has had Covid vaccine    Outside labs and US reveiwed.     RTC 1 y     Elle Price MD  Hepatology/Liver Transplant  Medical Director, Liver Transplantation  [facility]  ========================================================================  S:   Mr. Davidson is a 41 Y M with AIH. He was diagnosed with cirrhosis of unknown etiology in 2016.    He is doing well. Working from home. Completed basement [place] to add an office.    Then liver tests became markedly elevated in July 2017: , , Tbili 5, Aphos 343. He was tested for viral etiologies. 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 There was a moderately dense inflammatory infiltrate of lymphocytes, macrophages, eosinophils and plasma cells. Autoimmune hepatitis could not be excluded, but it was not classic for it. He was tested for antibodies in 2015 and 2014, which were negative.    He was started on prednisone and Imuran at that time. He responded well to this. He is on Imuran 75 mg and eligio 600/300.         Lab Test 02/10/22  1155   PROTTOTAL 9.1*   ALBUMIN 4.1   BILITOTAL 0.8   ALKPHOS 185*   AST 30   ALT 29     Lab Test 02/10/22  1155   WBC 4.8   RBC 4.61   HGB 14.4   HCT 45.2   MCV 98   MCH 31.2   MCHC 31.9   RDW 13.1   *     No new health problems. No new family history.    SHx: Twins Erin and Jonathan are 3 1/2     Exam  Gen Alert pleasant NAD  Resp No difficulty breathing. No cough  Skin No Jaundice  Eyes No icterus  Neuro HOOPER  MSK no muscle  "wasting  Psyche Pleasant, appropriate. Well groomed.    Kiel Davidson is a 41 year old male who is being evaluated via a billable video visit.      The patient has been notified of following:   \"This video visit will be conducted via a call between you and your physician/provider. We have found that certain health care needs can be provided without the need for an in-person physical exam.  This service lets us provide the care you need with a video conversation.  If a prescription is necessary we can send it directly to your pharmacy.  If lab work is needed we can place an order for that and you can then stop by our lab to have the test done at a later time. Video visits are billed at different rates depending on your insurance coverage.  Please reach out to your insurance provider with any questions.  If during the course of the call the physician/provider feels a video visit is not appropriate, you will not be charged for this service.\"   Patient has given verbal consent for Video visit? Yes      Type of service:  Video Visit  Video Start Time: 1006  Video End Time 1032  Patient location: home  Will anyone else be joining your video visit?   {If patient encounters technical issues they should call [phone] :1  Distant Location (provider location):  [facility]PATOLOGY CLINIC [place]   Mode of Communication:  Video Conference via [employer]  I have reviewed and updated the patient's Past Medical History, Social History, Family History and Medication List.          "

## 2022-02-11 NOTE — PROGRESS NOTES
"Kiel is a 41 year old who is being evaluated via a billable video visit.      How would you like to obtain your AVS? MyChart  If the video visit is dropped, the invitation should be resent by: Text to cell phone: [phone]  Will anyone else be joining your video visit? No  {If patient encounters technical issues they should call [phone] :[id number]}    Video Start Time: {video visit start/end time for provider to select:152948}  Video-Visit Details    Type of service:  Video Visit    Video End Time:{video visit start/end time for provider to select:152948}    Originating Location (pt. Location): {video visit patient location:741955::\"Home\"}    Distant Location (provider location):  [facility] HEPATOLOGY CLINIC [place]     Platform used for Video Visit: {Virtual Visit Platforms:590788::\"ShopVisible\"}    "

## 2022-05-09 ENCOUNTER — MYC MEDICAL ADVICE (OUTPATIENT)
Dept: GASTROENTEROLOGY | Facility: CLINIC | Age: 42
End: 2022-05-09
Payer: COMMERCIAL

## 2022-05-09 DIAGNOSIS — K75.4 AUTOIMMUNE HEPATITIS (H): Primary | ICD-10-CM

## 2022-05-19 ENCOUNTER — TELEPHONE (OUTPATIENT)
Dept: GASTROENTEROLOGY | Facility: CLINIC | Age: 42
End: 2022-05-19
Payer: COMMERCIAL

## 2022-05-19 NOTE — TELEPHONE ENCOUNTER
Called patient.  Let him know DEXA reviewed.  Normal.  Repeat in 3 years.     Patient verbalized understanding and had no further questions.    Jaylyn RIVERS LPN  Hepatology Clinic

## 2022-05-21 ENCOUNTER — HEALTH MAINTENANCE LETTER (OUTPATIENT)
Age: 42
End: 2022-05-21

## 2022-06-14 DIAGNOSIS — K75.4 AUTOIMMUNE HEPATITIS (H): Primary | ICD-10-CM

## 2022-06-14 DIAGNOSIS — U07.1 INFECTION DUE TO 2019 NOVEL CORONAVIRUS: ICD-10-CM

## 2022-09-17 ENCOUNTER — HEALTH MAINTENANCE LETTER (OUTPATIENT)
Age: 42
End: 2022-09-17

## 2022-11-02 DIAGNOSIS — K75.4 AUTOIMMUNE HEPATITIS (H): ICD-10-CM

## 2022-11-02 DIAGNOSIS — R94.5 ABNORMAL RESULTS OF LIVER FUNCTION STUDIES: ICD-10-CM

## 2022-11-07 RX ORDER — URSODIOL 300 MG/1
CAPSULE ORAL
Qty: 270 CAPSULE | Refills: 1 | Status: SHIPPED | OUTPATIENT
Start: 2022-11-07 | End: 2023-05-24

## 2023-01-18 ENCOUNTER — LAB (OUTPATIENT)
Dept: LAB | Facility: CLINIC | Age: 43
End: 2023-01-18
Payer: COMMERCIAL

## 2023-01-18 DIAGNOSIS — K75.4 AUTOIMMUNE HEPATITIS (H): ICD-10-CM

## 2023-01-18 LAB
ALBUMIN SERPL BCG-MCNC: 4.7 G/DL (ref 3.5–5.2)
ALP SERPL-CCNC: 329 U/L (ref 40–129)
ALT SERPL W P-5'-P-CCNC: 62 U/L (ref 10–50)
ANION GAP SERPL CALCULATED.3IONS-SCNC: 13 MMOL/L (ref 7–15)
AST SERPL W P-5'-P-CCNC: 69 U/L (ref 10–50)
BILIRUB DIRECT SERPL-MCNC: 0.26 MG/DL (ref 0–0.3)
BILIRUB SERPL-MCNC: 0.7 MG/DL
BUN SERPL-MCNC: 14.9 MG/DL (ref 6–20)
CALCIUM SERPL-MCNC: 9.7 MG/DL (ref 8.6–10)
CHLORIDE SERPL-SCNC: 102 MMOL/L (ref 98–107)
CREAT SERPL-MCNC: 0.9 MG/DL (ref 0.67–1.17)
DEPRECATED HCO3 PLAS-SCNC: 24 MMOL/L (ref 22–29)
ERYTHROCYTE [DISTWIDTH] IN BLOOD BY AUTOMATED COUNT: 13.6 % (ref 10–15)
GFR SERPL CREATININE-BSD FRML MDRD: >90 ML/MIN/1.73M2
GLUCOSE SERPL-MCNC: 96 MG/DL (ref 70–99)
HCT VFR BLD AUTO: 44 % (ref 40–53)
HGB BLD-MCNC: 14.3 G/DL (ref 13.3–17.7)
INR PPP: 1.04 (ref 0.85–1.15)
MCH RBC QN AUTO: 32.1 PG (ref 26.5–33)
MCHC RBC AUTO-ENTMCNC: 32.5 G/DL (ref 31.5–36.5)
MCV RBC AUTO: 99 FL (ref 78–100)
PLATELET # BLD AUTO: 113 10E3/UL (ref 150–450)
POTASSIUM SERPL-SCNC: 4.1 MMOL/L (ref 3.4–5.3)
PROT SERPL-MCNC: 8.5 G/DL (ref 6.4–8.3)
RBC # BLD AUTO: 4.46 10E6/UL (ref 4.4–5.9)
SODIUM SERPL-SCNC: 139 MMOL/L (ref 136–145)
WBC # BLD AUTO: 4 10E3/UL (ref 4–11)

## 2023-01-18 PROCEDURE — 36415 COLL VENOUS BLD VENIPUNCTURE: CPT

## 2023-01-18 PROCEDURE — 85610 PROTHROMBIN TIME: CPT

## 2023-01-18 PROCEDURE — 80053 COMPREHEN METABOLIC PANEL: CPT

## 2023-01-18 PROCEDURE — 82248 BILIRUBIN DIRECT: CPT

## 2023-01-18 PROCEDURE — 85027 COMPLETE CBC AUTOMATED: CPT

## 2023-01-19 DIAGNOSIS — K75.4 AUTOIMMUNE HEPATITIS (H): Primary | ICD-10-CM

## 2023-01-25 ENCOUNTER — LAB (OUTPATIENT)
Dept: LAB | Facility: CLINIC | Age: 43
End: 2023-01-25
Payer: COMMERCIAL

## 2023-01-25 DIAGNOSIS — K75.4 AUTOIMMUNE HEPATITIS (H): ICD-10-CM

## 2023-01-25 LAB
ALBUMIN SERPL BCG-MCNC: 4.6 G/DL (ref 3.5–5.2)
ALP SERPL-CCNC: 323 U/L (ref 40–129)
ALT SERPL W P-5'-P-CCNC: 61 U/L (ref 10–50)
AST SERPL W P-5'-P-CCNC: 63 U/L (ref 10–50)
BILIRUB DIRECT SERPL-MCNC: <0.2 MG/DL (ref 0–0.3)
BILIRUB SERPL-MCNC: 0.7 MG/DL
PROT SERPL-MCNC: 8.5 G/DL (ref 6.4–8.3)

## 2023-01-25 PROCEDURE — 80076 HEPATIC FUNCTION PANEL: CPT

## 2023-01-25 PROCEDURE — 36415 COLL VENOUS BLD VENIPUNCTURE: CPT

## 2023-01-27 ENCOUNTER — TELEPHONE (OUTPATIENT)
Dept: GASTROENTEROLOGY | Facility: CLINIC | Age: 43
End: 2023-01-27
Payer: COMMERCIAL

## 2023-01-27 DIAGNOSIS — R94.5 ABNORMAL RESULTS OF LIVER FUNCTION STUDIES: ICD-10-CM

## 2023-01-27 DIAGNOSIS — K75.4 AUTOIMMUNE HEPATITIS (H): Primary | ICD-10-CM

## 2023-01-27 RX ORDER — PREDNISONE 20 MG/1
40 TABLET ORAL DAILY
Qty: 120 TABLET | Refills: 1 | Status: SHIPPED | OUTPATIENT
Start: 2023-01-27 | End: 2023-03-20

## 2023-01-27 NOTE — TELEPHONE ENCOUNTER
Called patient and let him know Dr. Adrian, covering for Dr. Price, would like for him to start prednisone 40 mg daily for presumed flare of his autoimmune hepatitis.      Recommend repeat LFTS 1-2 weeks.    Patient verbalized understanding.  Prescription sent to [facility].    Jaylyn RIVERS LPN  Hepatology Clinic

## 2023-02-07 ENCOUNTER — LAB (OUTPATIENT)
Dept: LAB | Facility: CLINIC | Age: 43
End: 2023-02-07
Payer: COMMERCIAL

## 2023-02-07 DIAGNOSIS — R94.5 ABNORMAL RESULTS OF LIVER FUNCTION STUDIES: ICD-10-CM

## 2023-02-07 DIAGNOSIS — K75.4 AUTOIMMUNE HEPATITIS (H): ICD-10-CM

## 2023-02-07 LAB
ALBUMIN SERPL BCG-MCNC: 4.3 G/DL (ref 3.5–5.2)
ALP SERPL-CCNC: 228 U/L (ref 40–129)
ALT SERPL W P-5'-P-CCNC: 127 U/L (ref 10–50)
AST SERPL W P-5'-P-CCNC: 87 U/L (ref 10–50)
BILIRUB DIRECT SERPL-MCNC: 0.25 MG/DL (ref 0–0.3)
BILIRUB SERPL-MCNC: 0.7 MG/DL
PROT SERPL-MCNC: 7.9 G/DL (ref 6.4–8.3)

## 2023-02-07 PROCEDURE — 36415 COLL VENOUS BLD VENIPUNCTURE: CPT

## 2023-02-07 PROCEDURE — 80076 HEPATIC FUNCTION PANEL: CPT

## 2023-02-08 ENCOUNTER — MYC MEDICAL ADVICE (OUTPATIENT)
Dept: GASTROENTEROLOGY | Facility: CLINIC | Age: 43
End: 2023-02-08
Payer: COMMERCIAL

## 2023-02-08 DIAGNOSIS — K75.4 AUTOIMMUNE HEPATITIS (H): Primary | ICD-10-CM

## 2023-02-13 ENCOUNTER — LAB (OUTPATIENT)
Dept: LAB | Facility: CLINIC | Age: 43
End: 2023-02-13
Payer: COMMERCIAL

## 2023-02-13 DIAGNOSIS — K75.4 AUTOIMMUNE HEPATITIS (H): ICD-10-CM

## 2023-02-13 LAB
ALBUMIN SERPL BCG-MCNC: 4.5 G/DL (ref 3.5–5.2)
ALP SERPL-CCNC: 245 U/L (ref 40–129)
ALT SERPL W P-5'-P-CCNC: 198 U/L (ref 10–50)
AST SERPL W P-5'-P-CCNC: 136 U/L (ref 10–50)
BILIRUB DIRECT SERPL-MCNC: 0.42 MG/DL (ref 0–0.3)
BILIRUB SERPL-MCNC: 0.9 MG/DL
PROT SERPL-MCNC: 8.3 G/DL (ref 6.4–8.3)

## 2023-02-13 PROCEDURE — 80076 HEPATIC FUNCTION PANEL: CPT

## 2023-02-13 PROCEDURE — 36415 COLL VENOUS BLD VENIPUNCTURE: CPT

## 2023-02-14 DIAGNOSIS — K75.4 AUTOIMMUNE HEPATITIS (H): Primary | ICD-10-CM

## 2023-02-20 ENCOUNTER — ANESTHESIA EVENT (OUTPATIENT)
Dept: SURGERY | Facility: AMBULATORY SURGERY CENTER | Age: 43
End: 2023-02-20
Payer: COMMERCIAL

## 2023-02-21 ENCOUNTER — ANCILLARY PROCEDURE (OUTPATIENT)
Dept: RADIOLOGY | Facility: AMBULATORY SURGERY CENTER | Age: 43
End: 2023-02-21
Attending: INTERNAL MEDICINE
Payer: COMMERCIAL

## 2023-02-21 ENCOUNTER — HOSPITAL ENCOUNTER (OUTPATIENT)
Facility: AMBULATORY SURGERY CENTER | Age: 43
Discharge: HOME | End: 2023-02-21
Attending: RADIOLOGY
Payer: COMMERCIAL

## 2023-02-21 ENCOUNTER — ANESTHESIA (OUTPATIENT)
Dept: SURGERY | Facility: AMBULATORY SURGERY CENTER | Age: 43
End: 2023-02-21
Payer: COMMERCIAL

## 2023-02-21 VITALS
OXYGEN SATURATION: 98 % | DIASTOLIC BLOOD PRESSURE: 87 MMHG | TEMPERATURE: 98.5 F | HEIGHT: 68 IN | SYSTOLIC BLOOD PRESSURE: 134 MMHG | WEIGHT: 140 LBS | HEART RATE: 72 BPM | RESPIRATION RATE: 16 BRPM | BODY MASS INDEX: 21.22 KG/M2

## 2023-02-21 DIAGNOSIS — K75.4 AUTOIMMUNE HEPATITIS (H): ICD-10-CM

## 2023-02-21 LAB
ERYTHROCYTE [DISTWIDTH] IN BLOOD BY AUTOMATED COUNT: 13.5 % (ref 10–15)
HCT VFR BLD AUTO: 42.1 % (ref 40–53)
HGB BLD-MCNC: 13.9 G/DL (ref 13.3–17.7)
INR PPP: 1.01 (ref 0.85–1.15)
MCH RBC QN AUTO: 31.7 PG (ref 26.5–33)
MCHC RBC AUTO-ENTMCNC: 33 G/DL (ref 31.5–36.5)
MCV RBC AUTO: 96 FL (ref 78–100)
PLATELET # BLD AUTO: 91 10E3/UL (ref 150–450)
RBC # BLD AUTO: 4.39 10E6/UL (ref 4.4–5.9)
WBC # BLD AUTO: 5.1 10E3/UL (ref 4–11)

## 2023-02-21 PROCEDURE — 88313 SPECIAL STAINS GROUP 2: CPT | Mod: 26 | Performed by: PATHOLOGY

## 2023-02-21 PROCEDURE — 88307 TISSUE EXAM BY PATHOLOGIST: CPT | Mod: 26 | Performed by: PATHOLOGY

## 2023-02-21 PROCEDURE — 85610 PROTHROMBIN TIME: CPT | Performed by: PATHOLOGY

## 2023-02-21 PROCEDURE — 47000 NEEDLE BIOPSY OF LIVER PERQ: CPT | Performed by: RADIOLOGY

## 2023-02-21 PROCEDURE — 88313 SPECIAL STAINS GROUP 2: CPT | Mod: TC | Performed by: RADIOLOGY

## 2023-02-21 PROCEDURE — 76942 ECHO GUIDE FOR BIOPSY: CPT | Mod: 26 | Performed by: RADIOLOGY

## 2023-02-21 PROCEDURE — 47000 NEEDLE BIOPSY OF LIVER PERQ: CPT

## 2023-02-21 PROCEDURE — 85027 COMPLETE CBC AUTOMATED: CPT | Performed by: PATHOLOGY

## 2023-02-21 RX ORDER — FENTANYL CITRATE 50 UG/ML
50 INJECTION, SOLUTION INTRAMUSCULAR; INTRAVENOUS EVERY 5 MIN PRN
Status: DISCONTINUED | OUTPATIENT
Start: 2023-02-21 | End: 2023-02-22 | Stop reason: HOSPADM

## 2023-02-21 RX ORDER — HYDROMORPHONE HYDROCHLORIDE 1 MG/ML
0.2 INJECTION, SOLUTION INTRAMUSCULAR; INTRAVENOUS; SUBCUTANEOUS EVERY 5 MIN PRN
Status: DISCONTINUED | OUTPATIENT
Start: 2023-02-21 | End: 2023-02-22 | Stop reason: HOSPADM

## 2023-02-21 RX ORDER — OXYCODONE HYDROCHLORIDE 5 MG/1
5 TABLET ORAL EVERY 4 HOURS PRN
Status: DISCONTINUED | OUTPATIENT
Start: 2023-02-21 | End: 2023-02-22 | Stop reason: HOSPADM

## 2023-02-21 RX ORDER — ONDANSETRON 2 MG/ML
4 INJECTION INTRAMUSCULAR; INTRAVENOUS EVERY 30 MIN PRN
Status: DISCONTINUED | OUTPATIENT
Start: 2023-02-21 | End: 2023-02-22 | Stop reason: HOSPADM

## 2023-02-21 RX ORDER — HYDROMORPHONE HYDROCHLORIDE 1 MG/ML
0.4 INJECTION, SOLUTION INTRAMUSCULAR; INTRAVENOUS; SUBCUTANEOUS EVERY 5 MIN PRN
Status: DISCONTINUED | OUTPATIENT
Start: 2023-02-21 | End: 2023-02-22 | Stop reason: HOSPADM

## 2023-02-21 RX ORDER — PROPOFOL 10 MG/ML
INJECTION, EMULSION INTRAVENOUS CONTINUOUS PRN
Status: DISCONTINUED | OUTPATIENT
Start: 2023-02-21 | End: 2023-02-21

## 2023-02-21 RX ORDER — FENTANYL CITRATE 50 UG/ML
25 INJECTION, SOLUTION INTRAMUSCULAR; INTRAVENOUS EVERY 5 MIN PRN
Status: DISCONTINUED | OUTPATIENT
Start: 2023-02-21 | End: 2023-02-22 | Stop reason: HOSPADM

## 2023-02-21 RX ORDER — OXYCODONE HYDROCHLORIDE 5 MG/1
10 TABLET ORAL EVERY 4 HOURS PRN
Status: DISCONTINUED | OUTPATIENT
Start: 2023-02-21 | End: 2023-02-22 | Stop reason: HOSPADM

## 2023-02-21 RX ORDER — SODIUM CHLORIDE, SODIUM LACTATE, POTASSIUM CHLORIDE, CALCIUM CHLORIDE 600; 310; 30; 20 MG/100ML; MG/100ML; MG/100ML; MG/100ML
INJECTION, SOLUTION INTRAVENOUS CONTINUOUS
Status: DISCONTINUED | OUTPATIENT
Start: 2023-02-21 | End: 2023-02-22 | Stop reason: HOSPADM

## 2023-02-21 RX ORDER — LIDOCAINE 40 MG/G
CREAM TOPICAL
Status: DISCONTINUED | OUTPATIENT
Start: 2023-02-21 | End: 2023-02-22 | Stop reason: HOSPADM

## 2023-02-21 RX ORDER — ONDANSETRON 4 MG/1
4 TABLET, ORALLY DISINTEGRATING ORAL EVERY 30 MIN PRN
Status: DISCONTINUED | OUTPATIENT
Start: 2023-02-21 | End: 2023-02-22 | Stop reason: HOSPADM

## 2023-02-21 RX ORDER — LIDOCAINE HYDROCHLORIDE 20 MG/ML
INJECTION, SOLUTION INFILTRATION; PERINEURAL PRN
Status: DISCONTINUED | OUTPATIENT
Start: 2023-02-21 | End: 2023-02-21

## 2023-02-21 RX ORDER — PROPOFOL 10 MG/ML
INJECTION, EMULSION INTRAVENOUS PRN
Status: DISCONTINUED | OUTPATIENT
Start: 2023-02-21 | End: 2023-02-21

## 2023-02-21 RX ORDER — LIDOCAINE HYDROCHLORIDE 10 MG/ML
INJECTION, SOLUTION EPIDURAL; INFILTRATION; INTRACAUDAL; PERINEURAL PRN
Status: DISCONTINUED | OUTPATIENT
Start: 2023-02-21 | End: 2023-02-21 | Stop reason: HOSPADM

## 2023-02-21 RX ADMIN — SODIUM CHLORIDE, SODIUM LACTATE, POTASSIUM CHLORIDE, CALCIUM CHLORIDE: 600; 310; 30; 20 INJECTION, SOLUTION INTRAVENOUS at 08:58

## 2023-02-21 RX ADMIN — PROPOFOL 50 MG: 10 INJECTION, EMULSION INTRAVENOUS at 09:01

## 2023-02-21 RX ADMIN — PROPOFOL 150 MCG/KG/MIN: 10 INJECTION, EMULSION INTRAVENOUS at 09:01

## 2023-02-21 RX ADMIN — LIDOCAINE HYDROCHLORIDE 60 MG: 20 INJECTION, SOLUTION INFILTRATION; PERINEURAL at 09:01

## 2023-02-21 NOTE — BRIEF OP NOTE
[facility] [place]    Brief Operative Note    Pre-operative diagnosis: Elevated liver enzymes [R74.8]  Hepatitis, autoimmune (H) [K75.4]  Post-operative diagnosis Same as pre-operative diagnosis    Procedure: Procedure(s):  RANDOM NATIVE NEEDLE BIOPSY, LIVER, PERCUTANEOUS  Surgeon: Surgeon(s) and Role:     * He Pickering MD - Primary  Anesthesia: MAC   Estimated Blood Loss: None    Drains: None  Specimens:   ID Type Source Tests Collected by Time Destination   1 : random liver biopsy Biopsy Liver SURGICAL PATHOLOGY EXAM He Pickering MD 2/21/2023  7:56 AM      Findings:   None.  Complications: None.  Implants: * No implants in log *

## 2023-02-21 NOTE — H&P
A collaboration between [facility] and [facility]  Experts in minimally invasive, targeted treatments performed using imaging guidance    History & Physical    Patient Name:  Kiel Davidson   YOB: 1980  Medical Record Number (MRN):  [id number]  Age:  42 year old male      Requested IR procedure:  Liver biopsy    Indication / Associated Diagnosis:  elevated liver labs    Sedation planned:  Per anesthesia services, Monitored Anesthesia Care (MAC)    Expected Level: Moderate to Deep Sedation    Indication: Sedation is required for the following type of Procedure: Biopsy      Focused history and physical completed prior to procedure. I have reviewed the lab findings, diagnostic data, and medications.     I have determined this patient to be an appropriate candidate for the planned procedure and have reassessed the patient IMMEDIATELY PRIOR to procedure.    -----    Patient Data:    Physical Exam:  Heart:  RRR w/o mm  Pulm: Lungs CTA bilaterally  Airway: Mouth open fully, > 3 fingers wide    Review of Systems: (negative unless bolded and red font)  Heart: palpatations, h/o MI, HTN, chest pain, cardiac surgery  Pulm: sleep apnea, COPD, chronic cough, asthma  GI: nausea, vomiting, ulcers, reflux  MSK: significant arthralgias, myalgias, physical limitations, pain  Endo: diabetes  Heme: bleeding disorder, anticoagulation    -----    Past Medical History:   Diagnosis Date     Autoimmune hepatitis (H)      Liver cirrhosis secondary to HOANG (H)          Patient Active Problem List    Diagnosis Date Noted     Autoimmune hepatitis (H) 06/19/2018     Priority: Medium         Prescription Medications as of 2/21/2023       Rx Number Disp Refills Start End Last Dispensed Date Next Fill Date Owning Pharmacy    azaTHIOprine (IMURAN) 50 MG tablet  135 tablet 3 2/11/2022    [facility] [place], [place] - [address]    Sig: TAKE ONE AND ONE-HALF  "TABLETS BY MOUTH ONCE DAILY    Class: E-Prescribe    nirmatrelvir and ritonavir (PAXLOVID) therapy pack  30 each 0 6/14/2022    [id number] - [place], [place] - [address] AT [address]    Sig: Take 3 tablets by mouth 2 times daily Take 300 mg nirmatrelvir (two 150 mg tablets) with 100 mg ritonavir (one 100 mg tablet) for five days.  All three tablets taken together.    Class: E-Prescribe    Notes to Pharmacy: Date of symptom onset: 06/13/2022; Risk criteria met: Yes; Positive Covid-19 test: Yes; Weight >40 kg Yes; Renal fxn: normal;  Drug-Drug interactions reviewed & addressed: Yes    Route: Oral    predniSONE (DELTASONE) 20 MG tablet  120 tablet 1 1/27/2023    [id number] - [place], [place] - [address] AT [address]    Sig: Take 2 tablets (40 mg) by mouth daily    Class: E-Prescribe    Route: Oral    ursodiol (ACTIGALL) 300 MG capsule  270 capsule 1 11/7/2022    [facility] [place], [place] - [address]    Sig: TAKE 2 CAPSULE (600 MG) IN THE MORNING AND ONE CAPSULE (300 MG) IN THE EVENING.    Class: E-Prescribe      Hospital Medications as of 2/21/2023       Dose Frequency Start End    lactated ringers infusion  CONTINUOUS 2/21/2023     Class: E-Prescribe    Route: Intravenous    lidocaine (LMX4) kit  EVERY 1 HOUR PRN 2/21/2023     Admin Instructions: Apply at least 30 minutes prior to VAD insertion in divided doses as needed for size of site for insertion.  MAX Dose: 2.5 g (  of 5 g tube)  Do NOT give if patient has a history of allergy to any local anesthetic or any \"regan\" product.  Do NOT use both lidocaine intradermal/subcutaneous injection and the lidocaine cream on the same site.    Class: E-Prescribe    Route: Topical    lidocaine 1 % 0.1-1 mL 0.1-1 mL EVERY 1 HOUR PRN 2/21/2023     Admin Instructions: MAX dose 1 mL subcutaneous OR intradermal along the side of the vein in divided doses as needed for VAD " "insertion.  Do NOT give if patient has a history of allergy to any local anesthetic or any \"regan\" product.  Do NOT use both lidocaine intradermal/subcutaneous injection and the lidocaine cream on the same site.    Class: E-Prescribe    Route: Other    PRE OP antibiotics NOT needed for this surgical procedure. 1 each CONTINUOUS 2/21/2023     Admin Instructions: PRE OP antibiotics NOT needed for this surgical procedure.    Class: E-Prescribe    Route: As instructed    sodium chloride (PF) 0.9% PF flush 3 mL 3 mL EVERY 8 HOURS 2/21/2023     Admin Instructions: to lock peripheral IV dormant line    Class: E-Prescribe    Route: Intracatheter    sodium chloride (PF) 0.9% PF flush 3 mL 3 mL EVERY 1 MIN PRN 2/21/2023     Class: E-Prescribe    Route: Intracatheter            Allergies   Allergen Reactions     Nuts Other (See Comments)     Patient also allergic to tree nuts-has swelling of the throat     Peanut-Derived      Other reaction(s): Swelling         Complete Blood Count:  Lab Results   Component Value Date     01/18/2023    PLT 93 06/29/2021       Coagulation:  Lab Results   Component Value Date    INR 1.04 01/18/2023    INR 1.08 06/29/2021       Vital Signs:  BP (!) 138/91 (BP Location: Right arm)   Pulse 82   Temp 96.9  F (36.1  C) (Temporal)   Resp 16   Ht 1.727 m (5' 8\")   Wt 63.5 kg (140 lb)   SpO2 99%   BMI 21.29 kg/m      -----    Eligio Ruiz PA-C      "

## 2023-02-21 NOTE — ANESTHESIA POSTPROCEDURE EVALUATION
Patient: Kiel Davidson    Procedure: Procedure(s):  RANDOM NATIVE NEEDLE BIOPSY, LIVER, PERCUTANEOUS       Anesthesia Type:  MAC    Note:  Disposition: Outpatient   Postop Pain Control: Uneventful            Sign Out: Well controlled pain   PONV: No   Neuro/Psych: Uneventful            Sign Out: Acceptable/Baseline neuro status   Airway/Respiratory: Uneventful            Sign Out: Acceptable/Baseline resp. status   CV/Hemodynamics: Uneventful            Sign Out: Acceptable CV status; No obvious hypovolemia; No obvious fluid overload   Other NRE: NONE   DID A NON-ROUTINE EVENT OCCUR? No           Last vitals:  Vitals Value Taken Time   /87 02/21/23 1025   Temp 36.9  C (98.5  F) 02/21/23 1025   Pulse 72 02/21/23 1025   Resp 16 02/21/23 1025   SpO2 98 % 02/21/23 1025       Electronically Signed By: Henrry Huynh MD  February 21, 2023  10:57 AM

## 2023-02-21 NOTE — ANESTHESIA CARE TRANSFER NOTE
Patient: iKel Davidson    Procedure: Procedure(s):  RANDOM NATIVE NEEDLE BIOPSY, LIVER, PERCUTANEOUS       Diagnosis: Elevated liver enzymes [R74.8]  Hepatitis, autoimmune (H) [K75.4]  Diagnosis Additional Information: No value filed.    Anesthesia Type:   MAC     Note:    Oropharynx: oropharynx clear of all foreign objects and spontaneously breathing  Level of Consciousness: awake  Oxygen Supplementation: room air    Independent Airway: airway patency satisfactory and stable  Dentition: dentition unchanged  Vital Signs Stable: post-procedure vital signs reviewed and stable  Report to RN Given: handoff report given  Patient transferred to: Phase II    Handoff Report: Identifed the Patient, Identified the Reponsible Provider, Reviewed the pertinent medical history, Discussed the surgical course, Reviewed Intra-OP anesthesia mangement and issues during anesthesia, Set expectations for post-procedure period and Allowed opportunity for questions and acknowledgement of understanding      Vitals:  Vitals Value Taken Time   /65    Temp     Pulse 72    Resp 16    SpO2 98%        Electronically Signed By: PRINCE Huerta CRNA  February 21, 2023  9:24 AM

## 2023-02-21 NOTE — ANESTHESIA PREPROCEDURE EVALUATION
Anesthesia Pre-Procedure Evaluation    Patient: Kiel Davidson   MRN: [id number] : 1980        Procedure : Procedure(s):  RANDOM NATIVE NEEDLE BIOPSY, LIVER, PERCUTANEOUS          Past Medical History:   Diagnosis Date     Autoimmune hepatitis (H)      Liver cirrhosis secondary to HOANG (H)       Past Surgical History:   Procedure Laterality Date     ESOPHAGOSCOPY, GASTROSCOPY, DUODENOSCOPY (EGD), COMBINED N/A 2020    Procedure: ESOPHAGOGASTRODUODENOSCOPY (EGD);  Surgeon: Luis Mir MD;  Location:  GI     GI SURGERY      EGD for liver cirrhosis     HERNIA REPAIR      as a child     IR LIVER BIOPSY PERCUTANEOUS  2023     liver biopsies        Allergies   Allergen Reactions     Nuts Other (See Comments)     Patient also allergic to tree nuts-has swelling of the throat     Peanut-Derived      Other reaction(s): Swelling      Social History     Tobacco Use     Smoking status: Never     Smokeless tobacco: Never   Substance Use Topics     Alcohol use: No      Wt Readings from Last 1 Encounters:   23 63.5 kg (140 lb)        Anesthesia Evaluation            ROS/MED HX  ENT/Pulmonary:       Neurologic:       Cardiovascular:       METS/Exercise Tolerance:     Hematologic:       Musculoskeletal:       GI/Hepatic: Comment: Autoimmune hepatitis    (+) hepatitis type Other, liver disease,     Renal/Genitourinary:       Endo:       Psychiatric/Substance Use:       Infectious Disease:       Malignancy:       Other:            Physical Exam    Airway  airway exam normal           Respiratory Devices and Support         Dental       (+) Completely normal teeth      Cardiovascular   cardiovascular exam normal          Pulmonary   pulmonary exam normal                OUTSIDE LABS:  CBC:   Lab Results   Component Value Date    WBC 5.1 2023    WBC 4.0 2023    HGB 13.9 2023    HGB 14.3 2023    HCT 42.1 2023    HCT 44.0 2023    PLT 91 (L) 2023     (L)  01/18/2023     BMP:   Lab Results   Component Value Date     01/18/2023     06/29/2021    POTASSIUM 4.1 01/18/2023    POTASSIUM 4.1 06/29/2021    CHLORIDE 102 01/18/2023    CHLORIDE 106 06/29/2021    CO2 24 01/18/2023    CO2 30 06/29/2021    BUN 14.9 01/18/2023    BUN 13 06/29/2021    CR 0.90 01/18/2023    CR 0.97 06/29/2021    GLC 96 01/18/2023    GLC 80 06/29/2021     COAGS:   Lab Results   Component Value Date    INR 1.01 02/21/2023     POC: No results found for: BGM, HCG, HCGS  HEPATIC:   Lab Results   Component Value Date    ALBUMIN 4.5 02/13/2023    PROTTOTAL 8.3 02/13/2023     (H) 02/13/2023     (H) 02/13/2023    ALKPHOS 245 (H) 02/13/2023    BILITOTAL 0.9 02/13/2023     OTHER:   Lab Results   Component Value Date    JILL 9.7 01/18/2023       Anesthesia Plan    ASA Status:  2      Anesthesia Type: MAC.     - Reason for MAC: immobility needed   Induction: Intravenous.   Maintenance: TIVA.        Consents    Anesthesia Plan(s) and associated risks, benefits, and realistic alternatives discussed. Questions answered and patient/representative(s) expressed understanding.    - Discussed:     - Discussed with:  Patient      - Extended Intubation/Ventilatory Support Discussed: No.      - Patient is DNR/DNI Status: No    Use of blood products discussed: No .     Postoperative Care       PONV prophylaxis: Background Propofol Infusion     Comments:                Henrry Huynh MD

## 2023-02-21 NOTE — INTERVAL H&P NOTE
I have reviewed the surgical (or preoperative) H&P that is linked to this encounter, and examined the patient. There are no significant changes    Clinical Conditions Present on Arrival:  Clinically Significant Risk Factors Present on Admission                   # Thrombocytopenia: Lowest platelets = 91 in last 2 days, will monitor for bleeding

## 2023-02-21 NOTE — DISCHARGE INSTRUCTIONS
Discharge Instructions for Liver Biopsy  You had a procedure called liver biopsy. A healthcare provider used a special needle to remove a small piece of tissue from your liver.  A liver biopsy is ordered after other tests have shown that your liver is not working properly. You may also have a liver biopsy when liver disease is suspected.  Home care  Recommendations include the following:   If you had anesthesia, you should not drive until the day after your biopsy.   Remove the bandage covering the biopsy site 48 hours after the procedure.  Bedrest for 4 hours immediately after the procedure.  Don t shower for 48 hours after the biopsy. If you wish, you may wash yourself with a sponge or washcloth. When you are able to shower, don t scrub the site. Gently wash the area and pat it dry.  Don t lift anything heavier than 10 pounds for 3 days after the procedure, or as advised by your healthcare provider.  Don't do strenuous activities or exercises after the procedure.  Ask your healthcare provider when you can return to work.  Do not start taking blood thinners without clear instructions from your healthcare provider.  Follow-up care  Make a follow-up appointment as directed by our staff.     When to call your healthcare provider  Call your healthcare provider immediately if you have any of the following:  Bleeding from the biopsy site  Dizziness or lightheadedness  Sudden or increased shortness of breath  Sudden chest pain  Fever of 100.4 F (38.0 C) or higher, or as directed by your healthcare provider  Shaking chills  Increasing redness, tenderness, or swelling at the biopsy site  Drainage from the biopsy site  Opening of the biopsy site  Increasing pain, with or without activity, in the liver or belly area, or pain shooting to the right shoulder     Additional Instructions:    Please call our IR service for the following problems:       If the skin around the biopsy sight is swollen, reddened, painful, or has any  "discharge.  If you have persistent pain in biopsy sight.  If you have a fever of greater than 100.5  F and chills.  If you feel nauseated and \"just not right.\"      [facility]  Interventional Radiology (IR)  [address]  2nd Floor, [facility] [place] [address]    Contact Number:  [phone]  (IR control desk)  Monday - Friday 8:00 am - 4:30 pm    After hours for urgent concerns:  [phone]  After 4:30 pm Monday - Friday, Weekends and Holidays.   Ask for Interventional Radiology on-call.  Someone is available 24 hours a day.  [facility] toll free number:  [phone]              "

## 2023-02-22 LAB
PATH REPORT.COMMENTS IMP SPEC: NORMAL
PATH REPORT.FINAL DX SPEC: NORMAL
PATH REPORT.GROSS SPEC: NORMAL
PATH REPORT.MICROSCOPIC SPEC OTHER STN: NORMAL
PATH REPORT.RELEVANT HX SPEC: NORMAL
PHOTO IMAGE: NORMAL

## 2023-02-27 DIAGNOSIS — R94.5 ABNORMAL RESULTS OF LIVER FUNCTION STUDIES: ICD-10-CM

## 2023-02-27 DIAGNOSIS — K75.4 AUTOIMMUNE HEPATITIS (H): Primary | ICD-10-CM

## 2023-03-15 ENCOUNTER — LAB (OUTPATIENT)
Dept: LAB | Facility: CLINIC | Age: 43
End: 2023-03-15
Payer: COMMERCIAL

## 2023-03-15 DIAGNOSIS — R94.5 ABNORMAL RESULTS OF LIVER FUNCTION STUDIES: ICD-10-CM

## 2023-03-15 DIAGNOSIS — K75.4 AUTOIMMUNE HEPATITIS (H): ICD-10-CM

## 2023-03-15 LAB
ALBUMIN SERPL BCG-MCNC: 4.3 G/DL (ref 3.5–5.2)
ALP SERPL-CCNC: 197 U/L (ref 40–129)
ALT SERPL W P-5'-P-CCNC: 111 U/L (ref 10–50)
AST SERPL W P-5'-P-CCNC: 76 U/L (ref 10–50)
BILIRUB DIRECT SERPL-MCNC: 0.44 MG/DL (ref 0–0.3)
BILIRUB SERPL-MCNC: 1 MG/DL
PROT SERPL-MCNC: 8.1 G/DL (ref 6.4–8.3)

## 2023-03-15 PROCEDURE — 80076 HEPATIC FUNCTION PANEL: CPT

## 2023-03-15 PROCEDURE — 36415 COLL VENOUS BLD VENIPUNCTURE: CPT

## 2023-03-20 DIAGNOSIS — R94.5 ABNORMAL RESULTS OF LIVER FUNCTION STUDIES: ICD-10-CM

## 2023-03-20 DIAGNOSIS — K75.4 AUTOIMMUNE HEPATITIS (H): ICD-10-CM

## 2023-03-20 DIAGNOSIS — K75.4 AUTOIMMUNE HEPATITIS (H): Primary | ICD-10-CM

## 2023-03-20 RX ORDER — PREDNISONE 20 MG/1
30 TABLET ORAL DAILY
Qty: 135 TABLET | Refills: 1 | Status: SHIPPED | OUTPATIENT
Start: 2023-03-20 | End: 2023-04-12

## 2023-03-20 RX ORDER — PREDNISONE 20 MG/1
30 TABLET ORAL DAILY
Qty: 120 TABLET | Refills: 1 | COMMUNITY
Start: 2023-03-20 | End: 2023-03-20

## 2023-04-05 ENCOUNTER — LAB (OUTPATIENT)
Dept: LAB | Facility: CLINIC | Age: 43
End: 2023-04-05
Payer: COMMERCIAL

## 2023-04-05 DIAGNOSIS — K75.4 AUTOIMMUNE HEPATITIS (H): ICD-10-CM

## 2023-04-05 LAB
ALBUMIN SERPL BCG-MCNC: 4.3 G/DL (ref 3.5–5.2)
ALP SERPL-CCNC: 190 U/L (ref 40–129)
ALT SERPL W P-5'-P-CCNC: 76 U/L (ref 10–50)
AST SERPL W P-5'-P-CCNC: 62 U/L (ref 10–50)
BILIRUB DIRECT SERPL-MCNC: 0.43 MG/DL (ref 0–0.3)
BILIRUB SERPL-MCNC: 0.9 MG/DL
PROT SERPL-MCNC: 7.7 G/DL (ref 6.4–8.3)

## 2023-04-05 PROCEDURE — 36415 COLL VENOUS BLD VENIPUNCTURE: CPT

## 2023-04-05 PROCEDURE — 80076 HEPATIC FUNCTION PANEL: CPT

## 2023-04-12 DIAGNOSIS — K75.4 AUTOIMMUNE HEPATITIS (H): ICD-10-CM

## 2023-04-12 DIAGNOSIS — K75.4 AUTOIMMUNE HEPATITIS (H): Primary | ICD-10-CM

## 2023-04-12 DIAGNOSIS — R94.5 ABNORMAL RESULTS OF LIVER FUNCTION STUDIES: ICD-10-CM

## 2023-04-12 RX ORDER — AZATHIOPRINE 50 MG/1
TABLET ORAL
Qty: 180 TABLET | Refills: 3 | Status: SHIPPED | OUTPATIENT
Start: 2023-04-12 | End: 2024-04-30

## 2023-04-12 RX ORDER — PREDNISONE 20 MG/1
20 TABLET ORAL DAILY
Qty: 90 TABLET | Refills: 3 | COMMUNITY
Start: 2023-04-12

## 2023-05-16 ENCOUNTER — LAB (OUTPATIENT)
Dept: LAB | Facility: CLINIC | Age: 43
End: 2023-05-16
Payer: COMMERCIAL

## 2023-05-16 DIAGNOSIS — K75.4 AUTOIMMUNE HEPATITIS (H): ICD-10-CM

## 2023-05-16 PROCEDURE — 36415 COLL VENOUS BLD VENIPUNCTURE: CPT

## 2023-05-16 PROCEDURE — 80076 HEPATIC FUNCTION PANEL: CPT

## 2023-05-17 LAB
ALBUMIN SERPL BCG-MCNC: 4.3 G/DL (ref 3.5–5.2)
ALP SERPL-CCNC: 179 U/L (ref 40–129)
ALT SERPL W P-5'-P-CCNC: 166 U/L (ref 10–50)
AST SERPL W P-5'-P-CCNC: 104 U/L (ref 10–50)
BILIRUB DIRECT SERPL-MCNC: 0.34 MG/DL (ref 0–0.3)
BILIRUB SERPL-MCNC: 0.7 MG/DL
PROT SERPL-MCNC: 7.9 G/DL (ref 6.4–8.3)

## 2023-05-19 DIAGNOSIS — K75.4 AUTOIMMUNE HEPATITIS (H): Primary | ICD-10-CM

## 2023-05-22 DIAGNOSIS — R94.5 ABNORMAL RESULTS OF LIVER FUNCTION STUDIES: ICD-10-CM

## 2023-05-22 DIAGNOSIS — K75.4 AUTOIMMUNE HEPATITIS (H): ICD-10-CM

## 2023-05-24 RX ORDER — URSODIOL 300 MG/1
CAPSULE ORAL
Qty: 270 CAPSULE | Refills: 0 | Status: SHIPPED | OUTPATIENT
Start: 2023-05-24 | End: 2023-07-24

## 2023-06-04 ENCOUNTER — HEALTH MAINTENANCE LETTER (OUTPATIENT)
Age: 43
End: 2023-06-04

## 2023-07-24 ENCOUNTER — VIRTUAL VISIT (OUTPATIENT)
Dept: GASTROENTEROLOGY | Facility: CLINIC | Age: 43
End: 2023-07-24
Attending: INTERNAL MEDICINE
Payer: COMMERCIAL

## 2023-07-24 DIAGNOSIS — K75.4 AUTOIMMUNE HEPATITIS (H): Primary | ICD-10-CM

## 2023-07-24 DIAGNOSIS — R94.5 ABNORMAL RESULTS OF LIVER FUNCTION STUDIES: ICD-10-CM

## 2023-07-24 PROCEDURE — 99215 OFFICE O/P EST HI 40 MIN: CPT | Mod: VID | Performed by: INTERNAL MEDICINE

## 2023-07-24 PROCEDURE — 99417 PROLNG OP E/M EACH 15 MIN: CPT | Performed by: INTERNAL MEDICINE

## 2023-07-24 RX ORDER — PREDNISONE 5 MG/1
TABLET ORAL
Qty: 84 TABLET | Refills: 0 | Status: SHIPPED | OUTPATIENT
Start: 2023-07-24 | End: 2023-07-24

## 2023-07-24 RX ORDER — URSODIOL 300 MG/1
CAPSULE ORAL
Qty: 270 CAPSULE | Refills: 3 | Status: SHIPPED | OUTPATIENT
Start: 2023-07-24 | End: 2023-08-24

## 2023-07-24 RX ORDER — PREDNISONE 5 MG/1
TABLET ORAL
Qty: 84 TABLET | Refills: 0 | Status: SHIPPED | OUTPATIENT
Start: 2023-07-24 | End: 2023-09-04

## 2023-07-24 ASSESSMENT — PAIN SCALES - GENERAL: PAINLEVEL: NO PAIN (0)

## 2023-07-24 NOTE — PROGRESS NOTES
"Virtual Visit Details    Type of service:  Video Visit     Originating Location (pt. Location): {video visit patient location:656770::\"Home\"}  {PROVIDER LOCATION On-site should be selected for visits conducted from your clinic location or adjoining [facility] hospital, academic office, or other nearby [facility] building. Off-site should be selected for all other provider locations, including home:664748}  Distant Location (provider location):  {virtual location provider:083381}  Platform used for Video Visit: {Virtual Visit Platforms:902233::\"Quorum\"}    "

## 2023-07-24 NOTE — NURSING NOTE
Is the patient currently in the state of [place]? YES    Visit mode:VIDEO    If the visit is dropped, the patient can be reconnected by: VIDEO VISIT: Send to e-mail at: [email or web address]    Will anyone else be joining the visit? NO      How would you like to obtain your AVS? MyChart    Are changes needed to the allergy or medication list? NO    Reason for visit: No chief complaint on file.

## 2023-07-24 NOTE — LETTER
7/24/2023         RE: Kiel Davidson  [address] [facility] [address]        Dear Colleague,    Thank you for referring your patient, Kiel Davidson, to the [facility] [place]. Please see a copy of my visit note below.    [facility]   Liver Clinic follow up      A/P  Mr. 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  I would like him to get and MRI/MRCP to assess for changes c/w PSC.     We discussed PSC, progression/natural history, association with IBD (will need colonoscopy--can wait until current plan is complete) and unpredictable nature of PSC    He is on azathioprine 100, eligio 600/300 and prednisone 20. The prednisone has not improved his LFTs. I will wean him off by 5 mg every 2 weeks. Needs baseline labs. Ordered.    Cirrhosis well compensated.     HCC screening MRI/MRCP ordered  Variceal screening EGD 11/6/20 Due this year. Will get with colonoscopy  Bone density Normal in 2017.  Proph has had Covid vaccine       RTC 3-4 m   Elle Price MD  Hepatology/Liver Transplant  Medical Director, Liver Transplantation  [facility]  ========================================================================  S:   Mr. Davidson is a 42 Y M with AIH. He was diagnosed with cirrhosis of unknown etiology in 2016.    Liver tests became markedly elevated in July 2017:  , , TB 5.   Liver biopsy 7/20/17: sinusoidal fibrosis, cholestatic hepatitis. There was a moderately dense inflammatory infiltrate of lymphocytes, macrophages, eosinophils and plasma cells. Autoimmune hepatitis could not be excluded, but it was not classic for it. He was tested for antibodies in 2015 and 2014, which were negative.    He was started on prednisone and Imuran then. He responded well to this at that time    In the last year, his LFTs have become elevated  ALT was normal 2/2022. From 1/2023-current:    AST same pattern  AP has been elevated throughout the last 3 y, 177-329.  TB nl    Because of the elevations in his LFTs, he was started on prednisone 40 mg 1/27/23 by my partner (in my absence; now on 20 mg) and azathioprine was increased from 75 to 100. This has not improved his LFTs. He continues on eligio.  He then underwent liver biopsy 2/17/23  LIVER, NEEDLE BIOPSY:  Inactive cirrhosis, Laennec stage 4A:   -Consistent with autoimmune hepatitis currently quiescent on treatment   -Subtle portal neutrophils of unclear significance   (See Comment)      Electronically signed by Asha Macias MD on 2/22/2023 at  9:52 AM   Comment  UUMAYO   The sample size is adequate but shows no morphologic explanation for ALT of 198,  or , although these numbers predated this biopsy by more than a week. The sample shows with the trichrome and reticulin stains, advanced fibrosis with cirrhotic regenerative nodules. Otherwise there is no active portal, interface, or lobular inflammation. Steatosis, congestion, necrosis are all absent. The fibrotic portal areas shows more than usual aggregates of neutrophils in an otherwise quiescent parenchymal context. It is unclear what this represents but imaging to rule out the possibility of an evolving biliary tree overlap (eg overlapping PSC) should be considered.     Bile ducts are present in normal numbers and architecture. Iron stain is negative for stainable parenchymal or Kupffer iron. The PAS and PAS-D stains show no abnormal alpha-1-antitrypsin profile or other cytoplasmic accumulations.         Liver Function Studies - Recent Labs   Lab Test 05/16/23  1441   PROTTOTAL 7.9   ALBUMIN 4.3   BILITOTAL 0.7   ALKPHOS 179*   *   *     CBC RESULTS: Recent Labs   Lab Test 02/21/23  0751   WBC 5.1   RBC 4.39*   HGB 13.9   HCT 42.1   MCV 96   MCH 31.7   MCHC 33.0   RDW 13.5   PLT 91*     No new health problems. No new family history.    SHx: Twins Erin and  "Jonathan are 4 1/2     Exam  Gen Alert pleasant NAD  Resp No difficulty breathing. No cough  Skin No Jaundice  Eyes No icterus  Neuro HOOPER  MSK no muscle wasting  Psyche Pleasant, appropriate. Well groomed.    Kiel Davidson is a 42 year old male who is being evaluated via a billable video visit.      The patient has been notified of following:   \"This video visit will be conducted via a call between you and your physician/provider. We have found that certain health care needs can be provided without the need for an in-person physical exam.  This service lets us provide the care you need with a video conversation.  If a prescription is necessary we can send it directly to your pharmacy.  If lab work is needed we can place an order for that and you can then stop by our lab to have the test done at a later time. Video visits are billed at different rates depending on your insurance coverage.  Please reach out to your insurance provider with any questions.  If during the course of the call the physician/provider feels a video visit is not appropriate, you will not be charged for this service.\"   Patient has given verbal consent for Video visit? Yes      Type of service:  Video Visit  Video Start Time: 1001  Video End Time 1040  Patient location: home  Will anyone else be joining your video visit?   {If patient encounters technical issues they should call [phone] :1  Distant Location (provider location):  [facility]PATOLOGY [facility] [place]   Mode of Communication:  Video Conference via [employer]  I have reviewed and updated the patient's Past Medical History, Social History, Family History and Medication List.    Time today in minutes 69  Record review 15  Communication with care team 0  Face to face with patient on video 39  Documentation 15                Again, thank you for allowing me to participate in the care of your patient.        Sincerely,        Elle Price MD    "

## 2023-07-24 NOTE — PROGRESS NOTES
[facility]   Liver Clinic follow up      A/P  Mr. 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  I would like him to get and MRI/MRCP to assess for changes c/w PSC.     We discussed PSC, progression/natural history, association with IBD (will need colonoscopy--can wait until current plan is complete) and unpredictable nature of PSC    He is on azathioprine 100, eligio 600/300 and prednisone 20. The prednisone has not improved his LFTs. I will wean him off by 5 mg every 2 weeks. Needs baseline labs. Ordered.    Cirrhosis well compensated.     HCC screening MRI/MRCP ordered  Variceal screening EGD 11/6/20 Due this year. Will get with colonoscopy  Bone density Normal in 2017.  Proph has had Covid vaccine       RTC 3-4 m   Elle Price MD  Hepatology/Liver Transplant  Medical Director, Liver Transplantation  [facility]  ========================================================================  S:   Mr. Davidson is a 42 Y M with AIH. He was diagnosed with cirrhosis of unknown etiology in 2016.    Liver tests became markedly elevated in July 2017:  , , TB 5.   Liver biopsy 7/20/17: sinusoidal fibrosis, cholestatic hepatitis. There was a moderately dense inflammatory infiltrate of lymphocytes, macrophages, eosinophils and plasma cells. Autoimmune hepatitis could not be excluded, but it was not classic for it. He was tested for antibodies in 2015 and 2014, which were negative.    He was started on prednisone and Imuran then. He responded well to this at that time    In the last year, his LFTs have become elevated  ALT was normal 2/2022. From 1/2023-current:   AST same pattern  AP has been elevated throughout the last 3 y, 177-329.  TB nl    Because of the elevations in his LFTs, he was started on prednisone 40 mg 1/27/23 by my partner (in my absence; now on 20 mg) and azathioprine was increased from 75 to  100. This has not improved his LFTs. He continues on eligio.  He then underwent liver biopsy 2/17/23  LIVER, NEEDLE BIOPSY:  Inactive cirrhosis, Laennec stage 4A:   -Consistent with autoimmune hepatitis currently quiescent on treatment   -Subtle portal neutrophils of unclear significance   (See Comment)      Electronically signed by Asha Macias MD on 2/22/2023 at  9:52 AM   Comment  UUMAYO   The sample size is adequate but shows no morphologic explanation for ALT of 198,  or , although these numbers predated this biopsy by more than a week. The sample shows with the trichrome and reticulin stains, advanced fibrosis with cirrhotic regenerative nodules. Otherwise there is no active portal, interface, or lobular inflammation. Steatosis, congestion, necrosis are all absent. The fibrotic portal areas shows more than usual aggregates of neutrophils in an otherwise quiescent parenchymal context. It is unclear what this represents but imaging to rule out the possibility of an evolving biliary tree overlap (eg overlapping PSC) should be considered.     Bile ducts are present in normal numbers and architecture. Iron stain is negative for stainable parenchymal or Kupffer iron. The PAS and PAS-D stains show no abnormal alpha-1-antitrypsin profile or other cytoplasmic accumulations.         Liver Function Studies - Recent Labs   Lab Test 05/16/23  1441   PROTTOTAL 7.9   ALBUMIN 4.3   BILITOTAL 0.7   ALKPHOS 179*   *   *     CBC RESULTS: Recent Labs   Lab Test 02/21/23  0751   WBC 5.1   RBC 4.39*   HGB 13.9   HCT 42.1   MCV 96   MCH 31.7   MCHC 33.0   RDW 13.5   PLT 91*     No new health problems. No new family history.    SHx: Twins Erin and Jonathan are 4 1/2     Exam  Gen Alert pleasant NAD  Resp No difficulty breathing. No cough  Skin No Jaundice  Eyes No icterus  Neuro HOOPER  MSK no muscle wasting  Psyche Pleasant, appropriate. Well groomed.    Kiel Davidson is a 42 year old male who is being  "evaluated via a billable video visit.      The patient has been notified of following:   \"This video visit will be conducted via a call between you and your physician/provider. We have found that certain health care needs can be provided without the need for an in-person physical exam.  This service lets us provide the care you need with a video conversation.  If a prescription is necessary we can send it directly to your pharmacy.  If lab work is needed we can place an order for that and you can then stop by our lab to have the test done at a later time. Video visits are billed at different rates depending on your insurance coverage.  Please reach out to your insurance provider with any questions.  If during the course of the call the physician/provider feels a video visit is not appropriate, you will not be charged for this service.\"   Patient has given verbal consent for Video visit? Yes      Type of service:  Video Visit  Video Start Time: 1001  Video End Time 1040  Patient location: home  Will anyone else be joining your video visit?   {If patient encounters technical issues they should call [phone] :1  Distant Location (provider location):  [facility]PATOLOGY [facility] [place]   Mode of Communication:  Video Conference via [employer]  I have reviewed and updated the patient's Past Medical History, Social History, Family History and Medication List.    Time today in minutes 69  Record review 15  Communication with care team 0  Face to face with patient on video 39  Documentation 15            "

## 2023-07-31 ENCOUNTER — TELEPHONE (OUTPATIENT)
Dept: GASTROENTEROLOGY | Facility: CLINIC | Age: 43
End: 2023-07-31
Payer: COMMERCIAL

## 2023-07-31 ENCOUNTER — LAB (OUTPATIENT)
Dept: LAB | Facility: CLINIC | Age: 43
End: 2023-07-31
Payer: COMMERCIAL

## 2023-07-31 DIAGNOSIS — K75.4 AUTOIMMUNE HEPATITIS (H): ICD-10-CM

## 2023-07-31 DIAGNOSIS — R94.5 ABNORMAL RESULTS OF LIVER FUNCTION STUDIES: ICD-10-CM

## 2023-07-31 LAB
ALBUMIN SERPL BCG-MCNC: 4.4 G/DL (ref 3.5–5.2)
ALP SERPL-CCNC: 236 U/L (ref 40–129)
ALT SERPL W P-5'-P-CCNC: 187 U/L (ref 0–70)
ANION GAP SERPL CALCULATED.3IONS-SCNC: 11 MMOL/L (ref 7–15)
AST SERPL W P-5'-P-CCNC: 144 U/L (ref 0–45)
BILIRUB DIRECT SERPL-MCNC: 0.48 MG/DL (ref 0–0.3)
BILIRUB SERPL-MCNC: 1 MG/DL
BUN SERPL-MCNC: 15.1 MG/DL (ref 6–20)
CALCIUM SERPL-MCNC: 9.8 MG/DL (ref 8.6–10)
CHLORIDE SERPL-SCNC: 100 MMOL/L (ref 98–107)
CREAT SERPL-MCNC: 1.07 MG/DL (ref 0.67–1.17)
DEPRECATED HCO3 PLAS-SCNC: 27 MMOL/L (ref 22–29)
ERYTHROCYTE [DISTWIDTH] IN BLOOD BY AUTOMATED COUNT: 13.8 % (ref 10–15)
GFR SERPL CREATININE-BSD FRML MDRD: 90 ML/MIN/1.73M2
GLUCOSE SERPL-MCNC: 98 MG/DL (ref 70–99)
HCT VFR BLD AUTO: 44.1 % (ref 40–53)
HGB BLD-MCNC: 14.3 G/DL (ref 13.3–17.7)
INR PPP: 1.03 (ref 0.85–1.15)
MCH RBC QN AUTO: 32.3 PG (ref 26.5–33)
MCHC RBC AUTO-ENTMCNC: 32.4 G/DL (ref 31.5–36.5)
MCV RBC AUTO: 100 FL (ref 78–100)
PLATELET # BLD AUTO: 106 10E3/UL (ref 150–450)
POTASSIUM SERPL-SCNC: 3.6 MMOL/L (ref 3.4–5.3)
PROT SERPL-MCNC: 7.8 G/DL (ref 6.4–8.3)
RBC # BLD AUTO: 4.43 10E6/UL (ref 4.4–5.9)
SODIUM SERPL-SCNC: 138 MMOL/L (ref 136–145)
WBC # BLD AUTO: 4.9 10E3/UL (ref 4–11)

## 2023-07-31 PROCEDURE — 82248 BILIRUBIN DIRECT: CPT

## 2023-07-31 PROCEDURE — 86037 ANCA TITER EACH ANTIBODY: CPT

## 2023-07-31 PROCEDURE — 36415 COLL VENOUS BLD VENIPUNCTURE: CPT

## 2023-07-31 PROCEDURE — 85610 PROTHROMBIN TIME: CPT

## 2023-07-31 PROCEDURE — 99000 SPECIMEN HANDLING OFFICE-LAB: CPT

## 2023-07-31 PROCEDURE — 86381 MITOCHONDRIAL ANTIBODY EACH: CPT

## 2023-07-31 PROCEDURE — 85027 COMPLETE CBC AUTOMATED: CPT

## 2023-07-31 PROCEDURE — 80053 COMPREHEN METABOLIC PANEL: CPT

## 2023-07-31 PROCEDURE — 86036 ANCA SCREEN EACH ANTIBODY: CPT

## 2023-07-31 PROCEDURE — 86301 IMMUNOASSAY TUMOR CA 19-9: CPT | Mod: 90

## 2023-07-31 PROCEDURE — 83516 IMMUNOASSAY NONANTIBODY: CPT | Mod: 90

## 2023-08-01 LAB
CANCER AG19-9 SERPL IA-ACNC: 5 U/ML
MITOCHONDRIA M2 IGG SER-ACNC: 2.3 U/ML
SMA IGG SER-ACNC: 16 UNITS

## 2023-08-04 LAB
ANCA AB PATTERN SER IF-IMP: ABNORMAL
C-ANCA TITR SER IF: ABNORMAL {TITER}

## 2023-08-22 DIAGNOSIS — K75.4 AUTOIMMUNE HEPATITIS (H): ICD-10-CM

## 2023-08-22 DIAGNOSIS — R94.5 ABNORMAL RESULTS OF LIVER FUNCTION STUDIES: ICD-10-CM

## 2023-08-25 RX ORDER — URSODIOL 300 MG/1
CAPSULE ORAL
Qty: 270 CAPSULE | Refills: 3 | Status: SHIPPED | OUTPATIENT
Start: 2023-08-25 | End: 2024-10-01

## 2023-09-01 ENCOUNTER — HOSPITAL ENCOUNTER (OUTPATIENT)
Dept: MRI IMAGING | Facility: CLINIC | Age: 43
Discharge: HOME | End: 2023-09-01
Attending: INTERNAL MEDICINE | Admitting: INTERNAL MEDICINE
Payer: COMMERCIAL

## 2023-09-01 DIAGNOSIS — K75.4 AUTOIMMUNE HEPATITIS (H): ICD-10-CM

## 2023-09-01 DIAGNOSIS — K83.01 PSC (PRIMARY SCLEROSING CHOLANGITIS) (H): Primary | ICD-10-CM

## 2023-09-01 PROCEDURE — A9585 GADOBUTROL INJECTION: HCPCS | Performed by: INTERNAL MEDICINE

## 2023-09-01 PROCEDURE — 255N000002 HC RX 255 OP 636: Performed by: INTERNAL MEDICINE

## 2023-09-01 PROCEDURE — 74183 MRI ABD W/O CNTR FLWD CNTR: CPT

## 2023-09-01 RX ORDER — GADOBUTROL 604.72 MG/ML
6 INJECTION INTRAVENOUS ONCE
Status: COMPLETED | OUTPATIENT
Start: 2023-09-01 | End: 2023-09-01

## 2023-09-01 RX ADMIN — GADOBUTROL 6 ML: 604.72 INJECTION INTRAVENOUS at 07:06

## 2023-10-02 ENCOUNTER — TELEPHONE (OUTPATIENT)
Dept: GASTROENTEROLOGY | Facility: CLINIC | Age: 43
End: 2023-10-02
Payer: COMMERCIAL

## 2023-10-02 NOTE — TELEPHONE ENCOUNTER
"Endoscopy Scheduling Screen    Have you had a positive Covid test in the last 14 days?  No    Are you active on MyChart?   Yes    What insurance is in the chart?  Other:      Ordering/Referring Provider: Albert   (If ordering provider performs procedure, schedule with ordering provider unless otherwise instructed. )    BMI: Estimated body mass index is 21.29 kg/m  as calculated from the following:    Height as of 2/21/23: 1.727 m (5' 8\").    Weight as of 2/21/23: 63.5 kg (140 lb).     Sedation Ordered  moderate sedation.   If patient BMI > 50 do not schedule in [facility].    If patient BMI > 45 do not schedule at [facility].    Are you taking methadone or Suboxone?  No    Are you taking any prescription medications for pain 3 or more times per week?   No    Do you have a history of malignant hyperthermia or adverse reaction to anesthesia?  No    (Females) Are you currently pregnant?   No     Have you been diagnosed or told you have pulmonary hypertension?   No    Do you have an LVAD?  No    Have you been told you have moderate to severe sleep apnea?  No    Have you been told you have COPD, asthma, or any other lung disease?  No    Do you have any heart conditions?  No     Have you ever had an organ transplant?   No    Have you ever had or are you awaiting a heart or lung transplant?   No    Have you had a stroke or transient ischemic attack (TIA aka \"mini stroke\" in the last 6 months?   No    Have you been diagnosed with or been told you have cirrhosis of the liver?   Yes (RN Review required for scheduling unless scheduling in Hospital.)    Are you currently on dialysis?   No    Do you need assistance transferring?   No    BMI: Estimated body mass index is 21.29 kg/m  as calculated from the following:    Height as of 2/21/23: 1.727 m (5' 8\").    Weight as of 2/21/23: 63.5 kg (140 lb).     Is patients BMI > 40 and scheduling location [facility]?  No    Do you take an injectable medication for weight loss or diabetes (excluding " insulin)?  No    Do you take the medication Naltrexone?  No    Do you take blood thinners?  No       Prep   Are you currently on dialysis or do you have chronic kidney disease?  No    Do you have a diagnosis of diabetes?  No    Do you have a diagnosis of cystic fibrosis (CF)?  No    On a regular basis do you go 3 -5 days between bowel movements?  No    BMI > 40?  No    Preferred Pharmacy:      [id number] - [place], [place] - [address] [facility] [address] [facility] [address]  Phone: [phone] Fax: [phone]      Final Scheduling Details   Colonoscopy prep sent?  Standard MiraLAX    Procedure scheduled  Colonoscopy / Upper endoscopy (EGD)    Surgeon:  Albert     Date of procedure:  12/28/23     Pre-OP / PAC:   No - Not required for this site.    Location  [facility] - Per order.    Sedation   Moderate Sedation - Per order.      Patient Reminders:   You will receive a call from a Nurse to review instructions and health history.  This assessment must be completed prior to your procedure.  Failure to complete the Nurse assessment may result in the procedure being cancelled.      On the day of your procedure, please designate an adult(s) who can drive you home stay with you for the next 24 hours. The medicines used in the exam will make you sleepy. You will not be able to drive.      You cannot take public transportation, ride share services, or non-medical taxi service without a responsible caregiver.  Medical transport services are allowed with the requirement that a responsible caregiver will receive you at your destination.  We require that drivers and caregivers are confirmed prior to your procedure.

## 2023-11-27 ENCOUNTER — VIRTUAL VISIT (OUTPATIENT)
Dept: GASTROENTEROLOGY | Facility: CLINIC | Age: 43
End: 2023-11-27
Attending: INTERNAL MEDICINE
Payer: COMMERCIAL

## 2023-11-27 DIAGNOSIS — K75.4 AUTOIMMUNE HEPATITIS (H): Primary | ICD-10-CM

## 2023-11-27 PROCEDURE — 99215 OFFICE O/P EST HI 40 MIN: CPT | Mod: VID | Performed by: INTERNAL MEDICINE

## 2023-11-27 PROCEDURE — 99417 PROLNG OP E/M EACH 15 MIN: CPT | Performed by: INTERNAL MEDICINE

## 2023-11-27 NOTE — LETTER
11/27/2023         RE: Kiel Davidson  [address]  [facility] [address]        Dear Colleague,    Thank you for referring your patient, Kiel Davidson, to the [facility] HEPATOLOGY [facility] [place]. Please see a copy of my visit note below.    [facility]   Liver Clinic follow up      A/P  Mr. 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  ANCA +    We discussed PSC, progression/natural history, association with IBD. Discussed no treatment for PSC, but ongoing trials with vancomycin in adults with PSC are occurring.  -median 10-12 year after diagnosis before LT  -symptoms of cholangitis (fever, jaundice, abd pain)  -increased risk of gallstones (not discussed today)  -risk for CCA ~8% at 12 y and lack of consensus regarding screening with MRI/US/tumor markers  -association with IBD, particularly UC, but also Crohn's  ---increased risk of CRC  ---increased risk of pouchitis  -indications for LT excellent post-LT outcomes (similar to other indications) and chance of recurrence up to 20% at 5-10 y (did not specifically discuss post LT outcomes today)    He is on azathioprine 100, eligio 600/300. Prednisone has been off since about August.  Will continue this for the time being until the next labs.    HCC screening MRI/MRCP without mass. Ca19-9 and AFP normal Discussed alternating MR/US for cancer screening  Variceal screening EGD 11/6/20 Due this year. Will get with colonoscopy 12/2023  Need for evaluation for IBD Colonoscopy scheduled 12/28/23  Bone density Normal in 2017.  Proph has had Covid vaccine       Plan  1. Labs due. Ordered  2. Us in 6 mo.  3. Colonoscopy and EGD scheduled 12/28  4. RTC 3-4 m in person   Elle Price MD  Hepatology/Liver Transplant  Medical Director, Liver Transplantation  [facility]  [place]  ========================================================================  S:   Mr. Davidson is a 42 Y M with cirrhosis related to AIH.and withnew  PSC diagnosis 2023 based on biopsy and MRCP this year.    History of liver disease  He was originally diagnosed with liver disease around 2014 when he had imaging that suggested cirrhosis (imaging done for evaluation of hematuria). Biopsy at that time showed cirrhosis with no particularly features indicating etiology. Labs (ALT AST AP) were elevated about 2x normal at that time.I saw him at that time and my assessment was probable burnt out AIH.  Liver tests became markedly elevated 1/2017:  , , TB 5.   Liver biopsy 7/20/17  Sinusoidal fibrosis, cholestatic hepatitis. There was a moderately dense inflammatory infiltrate of lymphocytes, macrophages, eosinophils and plasma cells. Autoimmune hepatitis could not be excluded, but it was not classic for it. He was tested for antibodies in 2015 and 2014, which were negative.    He was started on prednisone and Imuran at that time. He responded well to this and prednisone was eventually stopped.    2022-23: LFTs became more elevated  ALT normal 2/2022. From 1/2023-current:   AST same pattern  AP has been elevated throughout the last 3 y, 177-329.  TB nl    Because of elevations in LFTs, he was started on prednisone 40 mg 1/27/23 by my partner (in my absence; now on 20 mg) and azathioprine was increased from 75 to 100. This did not improved his LFTs. He continued on eligio.    Liver biopsy 2/17/23  Inactive cirrhosis, Laennec stage 4A:   -Consistent with autoimmune hepatitis currently quiescent on treatment   -Subtle portal neutrophils of unclear significance  The sample size is adequate but shows no morphologic explanation for ALT of 198,  or , although these numbers predated this biopsy by more than a week. The sample shows with the trichrome and reticulin stains, advanced fibrosis  "with cirrhotic regenerative nodules. Otherwise there is no active portal, interface, or lobular inflammation. Steatosis, congestion, necrosis are all absent. The fibrotic portal areas shows more than usual aggregates of neutrophils in an otherwise quiescent parenchymal context. It is unclear what this represents but imaging to rule out the possibility of an evolving biliary tree overlap (eg overlapping PSC) should be considered.     Bile ducts are present in normal numbers and architecture. Iron stain is negative for stainable parenchymal or Kupffer iron. The PAS and PAS-D stains show no abnormal alpha-1-antitrypsin profile or other cytoplasmic accumulations.        Lab Test 07/31/23  1039   PROTTOTAL 7.8   ALBUMIN 4.4   BILITOTAL 1.0   ALKPHOS 236*   *   *       Lab Test 05/16/23  1441   PROTTOTAL 7.9   ALBUMIN 4.3   BILITOTAL 0.7   ALKPHOS 179*   *   *     CBC RESULTS: Recent Labs   Lab Test 02/21/23  0751   WBC 5.1   RBC 4.39*   HGB 13.9   HCT 42.1   MCV 96   MCH 31.7   MCHC 33.0   RDW 13.5   PLT 91*     No new health problems. No new family history.    SHx: Twins Erin and Jonathan are 4 1/2     Exam  Gen Alert pleasant NAD  Resp No difficulty breathing. No cough  Skin No Jaundice  Eyes No icterus  Neuro HOOPER  MSK no muscle wasting  Psyche Pleasant, appropriate. Well groomed.    Kiel Davidson is a 42 year old male who is being evaluated via a billable video visit.      The patient has been notified of following:   \"This video visit will be conducted via a call between you and your physician/provider. We have found that certain health care needs can be provided without the need for an in-person physical exam.  This service lets us provide the care you need with a video conversation.  If a prescription is necessary we can send it directly to your pharmacy.  If lab work is needed we can place an order for that and you can then stop by our lab to have the test done at a later time. Video " "visits are billed at different rates depending on your insurance coverage.  Please reach out to your insurance provider with any questions.  If during the course of the call the physician/provider feels a video visit is not appropriate, you will not be charged for this service.\"   Patient has given verbal consent for Video visit? Yes      Type of service:  Video Visit  Video Start Time: 1001  Video End Time 1040  Patient location: home  Will anyone else be joining your video visit?   {If patient encounters technical issues they should call [phone] :4  Distant Location (provider location):  [facility] HEPATOLOGY CLINIC [place]   Mode of Communication:  Video Conference via [employer]  I have reviewed and updated the patient's Past Medical History, Social History, Family History and Medication List.    Time today in minutes 69  Record review 15  Communication with care team 0  Face to face with patient on video 39  Documentation 15      REASON FOR CONSULTATION: Primary Sclerosing Cholangitis  REFERRING PROVIDER:    A/P  Kiel Davidson is a 42 year old male with PSC    PSC  CCA/HCC SCREENING  FAT SOLUBLE VITAMINS  BONE HEALTH         Elle Price MD  Hepatology/Liver Transplant  Medical Director, Liver Transplantation  [facility]  Subjective  Kiel Davidson is a 42 year old male referred for PSC.    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    Exam  Gen Alert pleasant NAD  Resp No difficulty breathing. No cough  Skin No Jaundice  Eyes No icterus  Neuro HOOPER  MSK no muscle wasting  Psyche Pleasant, appropriate. Well groomed.    Time today in minutes 75  Record review 10  Communication with care team  Face to face with patient on video 45  Documentation 20                  Again, thank you for allowing me to participate in the care of your patient.        Sincerely,        Elle Price MD

## 2023-11-27 NOTE — PROGRESS NOTES
[facility]   Liver Clinic follow up      A/P  Mr. 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  ANCA +    We discussed PSC, progression/natural history, association with IBD. Discussed no treatment for PSC, but ongoing trials with vancomycin in adults with PSC are occurring.  -median 10-12 year after diagnosis before LT  -symptoms of cholangitis (fever, jaundice, abd pain)  -increased risk of gallstones (not discussed today)  -risk for CCA ~8% at 12 y and lack of consensus regarding screening with MRI/US/tumor markers  -association with IBD, particularly UC, but also Crohn's  ---increased risk of CRC  ---increased risk of pouchitis  -indications for LT excellent post-LT outcomes (similar to other indications) and chance of recurrence up to 20% at 5-10 y (did not specifically discuss post LT outcomes today)    He is on azathioprine 100, eligio 600/300. Prednisone has been off since about August.  Will continue this for the time being until the next labs.    HCC screening MRI/MRCP without mass. Ca19-9 and AFP normal Discussed alternating MR/US for cancer screening  Variceal screening EGD 11/6/20 Due this year. Will get with colonoscopy 12/2023  Need for evaluation for IBD Colonoscopy scheduled 12/28/23  Bone density Normal in 2017.  Proph has had Covid vaccine       Plan  1. Labs due. Ordered  2. Us in 6 mo.  3. Colonoscopy and EGD scheduled 12/28  4. RTC 3-4 m in person   Elle Price MD  Hepatology/Liver Transplant  Medical Director, Liver Transplantation  [facility]  ========================================================================  S:   Mr. Davidson is a 42 Y M with cirrhosis related to AIH.and withnew  PSC diagnosis 2023 based on biopsy and MRCP this year.    History of liver disease  He was originally diagnosed with liver disease around  2014 when he had imaging that suggested cirrhosis (imaging done for evaluation of hematuria). Biopsy at that time showed cirrhosis with no particularly features indicating etiology. Labs (ALT AST AP) were elevated about 2x normal at that time.I saw him at that time and my assessment was probable burnt out AIH.  Liver tests became markedly elevated 1/2017:  , , TB 5.   Liver biopsy 7/20/17  Sinusoidal fibrosis, cholestatic hepatitis. There was a moderately dense inflammatory infiltrate of lymphocytes, macrophages, eosinophils and plasma cells. Autoimmune hepatitis could not be excluded, but it was not classic for it. He was tested for antibodies in 2015 and 2014, which were negative.    He was started on prednisone and Imuran at that time. He responded well to this and prednisone was eventually stopped.    2022-23: LFTs became more elevated  ALT normal 2/2022. From 1/2023-current:   AST same pattern  AP has been elevated throughout the last 3 y, 177-329.  TB nl    Because of elevations in LFTs, he was started on prednisone 40 mg 1/27/23 by my partner (in my absence; now on 20 mg) and azathioprine was increased from 75 to 100. This did not improved his LFTs. He continued on eligio.    Liver biopsy 2/17/23  Inactive cirrhosis, Laennec stage 4A:   -Consistent with autoimmune hepatitis currently quiescent on treatment   -Subtle portal neutrophils of unclear significance  The sample size is adequate but shows no morphologic explanation for ALT of 198,  or , although these numbers predated this biopsy by more than a week. The sample shows with the trichrome and reticulin stains, advanced fibrosis with cirrhotic regenerative nodules. Otherwise there is no active portal, interface, or lobular inflammation. Steatosis, congestion, necrosis are all absent. The fibrotic portal areas shows more than usual aggregates of neutrophils in an otherwise quiescent parenchymal context. It is unclear  "what this represents but imaging to rule out the possibility of an evolving biliary tree overlap (eg overlapping PSC) should be considered.     Bile ducts are present in normal numbers and architecture. Iron stain is negative for stainable parenchymal or Kupffer iron. The PAS and PAS-D stains show no abnormal alpha-1-antitrypsin profile or other cytoplasmic accumulations.      MRCP 9/1/23  1. Cirrhotic liver morphology and portal hypertension including splenomegaly and small upper abdominal varices.  2. No suspicious hepatic observation.  3. Limited MRCP sequences. Within this limitation, intrahepatic biliary beading and several focally dilated biliary radicles, which can be seen in the setting of PSC.    Lab Test 07/31/23  1039   PROTTOTAL 7.8   ALBUMIN 4.4   BILITOTAL 1.0   ALKPHOS 236*   *   *       Lab Test 05/16/23  1441   PROTTOTAL 7.9   ALBUMIN 4.3   BILITOTAL 0.7   ALKPHOS 179*   *   *     CBC RESULTS: Recent Labs   Lab Test 02/21/23  0751   WBC 5.1   RBC 4.39*   HGB 13.9   HCT 42.1   MCV 96   MCH 31.7   MCHC 33.0   RDW 13.5   PLT 91*     No new health problems. No new family history.    SHx: Twins Erin and Jonathan are 4 1/2     Exam  Gen Alert pleasant NAD  Resp No difficulty breathing. No cough  Skin No Jaundice  Eyes No icterus  Neuro HOOPER  MSK no muscle wasting  Psyche Pleasant, appropriate. Well groomed.    Kiel Davidson is a 42 year old male who is being evaluated via a billable video visit.      The patient has been notified of following:   \"This video visit will be conducted via a call between you and your physician/provider. We have found that certain health care needs can be provided without the need for an in-person physical exam.  This service lets us provide the care you need with a video conversation.  If a prescription is necessary we can send it directly to your pharmacy.  If lab work is needed we can place an order for that and you can then stop by our lab to have " "the test done at a later time. Video visits are billed at different rates depending on your insurance coverage.  Please reach out to your insurance provider with any questions.  If during the course of the call the physician/provider feels a video visit is not appropriate, you will not be charged for this service.\"   Patient has given verbal consent for Video visit? Yes      Type of service:  Video Visit  Video Start Time: 1001  Video End Time 1040  Patient location: home  Will anyone else be joining your video visit?   {If patient encounters technical issues they should call [phone] :1  Distant Location (provider location):  [facility] HEPATOLOGY CLINIC [place]   Mode of Communication:  Video Conference via [employer]  I have reviewed and updated the patient's Past Medical History, Social History, Family History and Medication List.    Time today in minutes 69  Record review 15  Communication with care team 0  Face to face with patient on video 39  Documentation 15    "

## 2023-11-27 NOTE — NURSING NOTE
Is the patient currently in the state of [place]? YES    Visit mode:VIDEO    If the visit is dropped, the patient can be reconnected by: VIDEO VISIT: Send to e-mail at: [email or web address]    Will anyone else be joining the visit? YES: How would they like to receive their invitation? Send to e-mail: PT's wife will be on camera  (If patient encounters technical issues they should call [phone])    How would you like to obtain your AVS? MyChart    Are changes needed to the allergy or medication list? No    Reason for visit: RECHECK    Fei HOYT

## 2023-12-11 ENCOUNTER — TELEPHONE (OUTPATIENT)
Dept: GASTROENTEROLOGY | Facility: CLINIC | Age: 43
End: 2023-12-11

## 2023-12-11 NOTE — TELEPHONE ENCOUNTER
Pre visit planning completed.      Procedure details:    Patient scheduled for Colonoscopy/Upper endoscopy (EGD) on 12/28/23.     Arrival time: 1200. Procedure time 1300    Pre op exam needed? N/A    Facility location: [facility]; [address] [place] [address], [address] Floor, [place] [address]    Sedation type: Conscious sedation     Indication for procedure:     cirrhosis, eval orf varices         Chart review:     Electronic implanted devices? No    Recent diagnosis of diverticulitis within the last 6 weeks? No    Diabetic? No        Medication review:    Anticoagulants? No    NSAIDS? No    Other medication HOLDING recommendations:  N/A      Prep for procedure:     Bowel prep recommendation: Standard Miralax   Due to:  standard bowel prep.    Prep instructions sent via [employer] Networksjessica Jaimes RN  Endoscopy Procedure Pre Assessment RN  [phone] option 4

## 2023-12-12 NOTE — TELEPHONE ENCOUNTER
Pre assessment completed for upcoming procedure.   (Please see previous telephone encounter notes for complete details)    Patient  returned call.       Procedure details:    Arrival time and facility location reviewed.    Pre op exam needed? N/A    Designated  policy reviewed. Instructed to have someone stay 6 hours post procedure.     COVID policy reviewed.      Medication review:    Medications reviewed. Please see supporting documentation below. Holding recommendations discussed (if applicable).     Prep for procedure:     Patient stated they have already reviewed the bowel prep instructions and had no questions. NPO instructions reviewed.    Patient was instructed to call if any questions or concerns arise.       Additional information needed?  N/A      Patient  verbalized understanding and had no questions or concerns at this time.      Elina Mendoza RN  Endoscopy Procedure Pre Assessment RN  [phone] option 4

## 2023-12-12 NOTE — TELEPHONE ENCOUNTER
Attempted to contact patient in order to complete pre assessment questions.     No answer. Left message to return call to [phone] option 4    Diamond Price RN  Endoscopy Procedure Pre Assessment RN

## 2023-12-26 ENCOUNTER — LAB (OUTPATIENT)
Dept: LAB | Facility: CLINIC | Age: 43
End: 2023-12-26
Payer: COMMERCIAL

## 2023-12-26 DIAGNOSIS — K75.4 AUTOIMMUNE HEPATITIS (H): ICD-10-CM

## 2023-12-26 LAB
ABO/RH(D): NORMAL
ALBUMIN SERPL BCG-MCNC: 3.8 G/DL (ref 3.5–5.2)
ALP SERPL-CCNC: 430 U/L (ref 40–150)
ALT SERPL W P-5'-P-CCNC: 34 U/L (ref 0–70)
ANION GAP SERPL CALCULATED.3IONS-SCNC: 9 MMOL/L (ref 7–15)
AST SERPL W P-5'-P-CCNC: 72 U/L (ref 0–45)
BILIRUB DIRECT SERPL-MCNC: 0.89 MG/DL (ref 0–0.3)
BILIRUB SERPL-MCNC: 1.5 MG/DL
BUN SERPL-MCNC: 12 MG/DL (ref 6–20)
CALCIUM SERPL-MCNC: 9.2 MG/DL (ref 8.6–10)
CHLORIDE SERPL-SCNC: 103 MMOL/L (ref 98–107)
CREAT SERPL-MCNC: 0.79 MG/DL (ref 0.67–1.17)
DEPRECATED HCO3 PLAS-SCNC: 26 MMOL/L (ref 22–29)
EGFRCR SERPLBLD CKD-EPI 2021: >90 ML/MIN/1.73M2
ERYTHROCYTE [DISTWIDTH] IN BLOOD BY AUTOMATED COUNT: 15.4 % (ref 10–15)
GLUCOSE SERPL-MCNC: 98 MG/DL (ref 70–99)
HCT VFR BLD AUTO: 37.8 % (ref 40–53)
HGB BLD-MCNC: 12.8 G/DL (ref 13.3–17.7)
INR PPP: 1.2 (ref 0.85–1.15)
MCH RBC QN AUTO: 34 PG (ref 26.5–33)
MCHC RBC AUTO-ENTMCNC: 33.9 G/DL (ref 31.5–36.5)
MCV RBC AUTO: 101 FL (ref 78–100)
PLATELET # BLD AUTO: 120 10E3/UL (ref 150–450)
POTASSIUM SERPL-SCNC: 3.8 MMOL/L (ref 3.4–5.3)
PROT SERPL-MCNC: 7.9 G/DL (ref 6.4–8.3)
RBC # BLD AUTO: 3.76 10E6/UL (ref 4.4–5.9)
SODIUM SERPL-SCNC: 138 MMOL/L (ref 135–145)
SPECIMEN EXPIRATION DATE: NORMAL
WBC # BLD AUTO: 4.2 10E3/UL (ref 4–11)

## 2023-12-26 PROCEDURE — 82248 BILIRUBIN DIRECT: CPT

## 2023-12-26 PROCEDURE — 85610 PROTHROMBIN TIME: CPT

## 2023-12-26 PROCEDURE — 86900 BLOOD TYPING SEROLOGIC ABO: CPT

## 2023-12-26 PROCEDURE — 86901 BLOOD TYPING SEROLOGIC RH(D): CPT

## 2023-12-26 PROCEDURE — 36415 COLL VENOUS BLD VENIPUNCTURE: CPT

## 2023-12-26 PROCEDURE — 80053 COMPREHEN METABOLIC PANEL: CPT

## 2023-12-26 PROCEDURE — 85027 COMPLETE CBC AUTOMATED: CPT

## 2023-12-28 ENCOUNTER — HOSPITAL ENCOUNTER (OUTPATIENT)
Facility: CLINIC | Age: 43
Discharge: HOME | End: 2023-12-28
Attending: INTERNAL MEDICINE | Admitting: INTERNAL MEDICINE
Payer: COMMERCIAL

## 2023-12-28 VITALS
DIASTOLIC BLOOD PRESSURE: 77 MMHG | OXYGEN SATURATION: 99 % | HEIGHT: 68 IN | RESPIRATION RATE: 20 BRPM | TEMPERATURE: 98.2 F | BODY MASS INDEX: 21.22 KG/M2 | HEART RATE: 94 BPM | WEIGHT: 140 LBS | SYSTOLIC BLOOD PRESSURE: 133 MMHG

## 2023-12-28 DIAGNOSIS — K83.01 PSC (PRIMARY SCLEROSING CHOLANGITIS) (H): Primary | ICD-10-CM

## 2023-12-28 DIAGNOSIS — K75.4 AUTOIMMUNE HEPATITIS (H): Primary | ICD-10-CM

## 2023-12-28 LAB
COLONOSCOPY: NORMAL
UPPER GI ENDOSCOPY: NORMAL

## 2023-12-28 PROCEDURE — 250N000011 HC RX IP 250 OP 636: Performed by: INTERNAL MEDICINE

## 2023-12-28 PROCEDURE — 88305 TISSUE EXAM BY PATHOLOGIST: CPT | Mod: 26 | Performed by: PATHOLOGY

## 2023-12-28 PROCEDURE — 43244 EGD VARICES LIGATION: CPT | Performed by: INTERNAL MEDICINE

## 2023-12-28 PROCEDURE — 88305 TISSUE EXAM BY PATHOLOGIST: CPT | Mod: TC | Performed by: INTERNAL MEDICINE

## 2023-12-28 PROCEDURE — 99153 MOD SED SAME PHYS/QHP EA: CPT | Performed by: INTERNAL MEDICINE

## 2023-12-28 PROCEDURE — 45380 COLONOSCOPY AND BIOPSY: CPT | Performed by: INTERNAL MEDICINE

## 2023-12-28 PROCEDURE — G0500 MOD SEDAT ENDO SERVICE >5YRS: HCPCS | Performed by: INTERNAL MEDICINE

## 2023-12-28 PROCEDURE — 250N000009 HC RX 250: Performed by: INTERNAL MEDICINE

## 2023-12-28 RX ORDER — FENTANYL CITRATE 50 UG/ML
INJECTION, SOLUTION INTRAMUSCULAR; INTRAVENOUS PRN
Status: DISCONTINUED | OUTPATIENT
Start: 2023-12-28 | End: 2023-12-28 | Stop reason: HOSPADM

## 2023-12-28 RX ORDER — HYDROCODONE BITARTRATE AND ACETAMINOPHEN 5; 325 MG/1; MG/1
1 TABLET ORAL EVERY 6 HOURS PRN
Qty: 6 TABLET | Refills: 0 | Status: SHIPPED | OUTPATIENT
Start: 2023-12-28 | End: 2023-12-31

## 2023-12-28 ASSESSMENT — ACTIVITIES OF DAILY LIVING (ADL): ADLS_ACUITY_SCORE: 35

## 2023-12-28 NOTE — OR NURSING
EGD with 4 ligation bands placed on esophageal varices.  Colonoscopy with biopsies completed and pt tolerated well with moderate sedation and on 2L nc oxygen.

## 2023-12-28 NOTE — OR NURSING
Dr. Price spoke with pt and pts wife regarding procedure findings, interventions, and follow up. All questions answered. Pt stable upon discharge via w/c with wife at side.

## 2023-12-28 NOTE — DISCHARGE INSTRUCTIONS
Discharge Instructions after Colonoscopy      Today you had a Colonoscopy    Activity and Diet  You were given medicine for pain. You may be dizzy or sleepy.  For 24 hours:   Do not drive or use heavy equipment.   Do not make important decisions.   Do not drink any alcohol.  You may return to your normal diet and medicines.    Discomfort   Air was placed in your colon during the exam in order to see it. Walking helps to pass the air.   You may take Tylenol (acetaminophen) for pain unless your doctor has told you not to.      Follow-up  __x__ We took small tissue samples or polyps to study. Your doctor will call you with the results  within two weeks.    When to call:    Call right away if you have:   Unusual pain in belly or chest pain not relieved with passing air.   More than 1 to 2 Tablespoons of bleeding from your rectum.   Fever above 100.6  F (37.5  C).    If you have severe pain, bleeding, or shortness of breath, go to an emergency room.    If you have questions, call:  Monday to Friday, 8 a.m. to 4:30 p.m.  Central Scheduling Department: [phone]    After hours  Hospital: [phone] (Ask for the GI fellow on call)      Discharge Instructions after  Upper Endoscopy (EGD)    Activity and Diet  You were given medicine for pain. You may be dizzy or sleepy.  For 24 hours:   Do not drive or use heavy equipment.   Do not make important decisions.   Do not drink any alcohol.    Discomfort  You may have a sore throat for 2 to 3 days. It may help to:   Avoid hot liquids for 24 hours.   Use sore throat lozenges.   Gargle as needed with salt water up to 4 times a day. Mix 1 cup of warm water  with 1 teaspoon of salt. Do not swallow.  _X__ Your esophagus was banded during the exam:   Drink only cool liquids for the rest of the day. Eat a soft diet for the next few days.   You may have a sore chest for 2 to 3 days.    You may take Tylenol (acetaminophen) for pain unless your doctor has told you not to.      When to  call us:  Problems are rare. Call right away if you have:   Unusual throat pain or trouble swallowing   Unusual pain in belly or chest that is not relieved by belching or passing air   Black stools (tar-like looking bowel movement)   Temperature above 100.6  F. (37.5  C).    If you vomit blood or have severe pain, go to an emergency room.    If you have questions, call:  Monday to Friday, 8 a.m. to 4:30 p.m.: Central Scheduling Department:[phone]    After hours: Hospital: [phone] (Ask for the GI fellow on call)

## 2023-12-29 LAB
PATH REPORT.COMMENTS IMP SPEC: NORMAL
PATH REPORT.COMMENTS IMP SPEC: NORMAL
PATH REPORT.FINAL DX SPEC: NORMAL
PATH REPORT.GROSS SPEC: NORMAL
PATH REPORT.MICROSCOPIC SPEC OTHER STN: NORMAL
PATH REPORT.RELEVANT HX SPEC: NORMAL
PHOTO IMAGE: NORMAL

## 2024-01-02 ENCOUNTER — TELEPHONE (OUTPATIENT)
Dept: GASTROENTEROLOGY | Facility: CLINIC | Age: 44
End: 2024-01-02
Payer: COMMERCIAL

## 2024-01-02 NOTE — TELEPHONE ENCOUNTER
"Endoscopy Scheduling Screen  Waiting for Dr. Sosa Response on scheduling    Have you had a positive Covid test in the last 14 days?  No    Are you active on MyChart?   Yes    What insurance is in the chart?  [employer]    Ordering/Referring Provider:  JENNIFER SOSA   (If ordering provider performs procedure, schedule with ordering provider unless otherwise instructed. )    BMI: Estimated body mass index is 21.29 kg/m  as calculated from the following:    Height as of 12/28/23: 1.727 m (5' 8\").    Weight as of 12/28/23: 63.5 kg (140 lb).     Sedation Ordered  moderate sedation.   If patient BMI > 50 do not schedule in [facility].    If patient BMI > 45 do not schedule at [facility].    Are you taking methadone or Suboxone?  No    Are you taking any prescription medications for pain 3 or more times per week?   NO - No RN review required.    Do you have a history of malignant hyperthermia or adverse reaction to anesthesia?  No    (Females) Are you currently pregnant?   No     Have you been diagnosed or told you have pulmonary hypertension?   No    Do you have an LVAD?  No    Have you been told you have moderate to severe sleep apnea?  No    Have you been told you have COPD, asthma, or any other lung disease?  Yes     What breathing problems do you have?  Asthma     Do you use home oxygen?  No    Have your breathing problems required an ED visit or hospitalization in the last year?  No    Do you have any heart conditions?  No     Have you ever had an organ transplant?   No    Have you ever had or are you awaiting a heart or lung transplant?   No    Have you had a stroke or transient ischemic attack (TIA aka \"mini stroke\" in the last 6 months?   No    Have you been diagnosed with or been told you have cirrhosis of the liver?   Yes (RN Review required for scheduling unless scheduling in Hospital.)    Are you currently on dialysis?   No    Do you need assistance transferring?   No    BMI: Estimated body mass index " "is 21.29 kg/m  as calculated from the following:    Height as of 12/28/23: 1.727 m (5' 8\").    Weight as of 12/28/23: 63.5 kg (140 lb).     Is patients BMI > 40 and scheduling location [facility]?  No    Do you take an injectable medication for weight loss or diabetes (excluding insulin)?  No    Do you take the medication Naltrexone?  No    Do you take blood thinners?  No       Prep   Are you currently on dialysis or do you have chronic kidney disease?  No    Do you have a diagnosis of diabetes?  No    Do you have a diagnosis of cystic fibrosis (CF)?  No    On a regular basis do you go 3 -5 days between bowel movements?  No    BMI > 40?  No    Preferred Pharmacy:        [id number] - [place], [place] - [address] [facility] [address]  [facility] [address]  Phone: [phone] Fax: [phone]          Final Scheduling Details   Colonoscopy prep sent?  N/A    Procedure scheduled  Upper endoscopy (EGD)  "

## 2024-01-03 NOTE — TELEPHONE ENCOUNTER
Endoscopy Scheduling Screen  Approved Dr. Schreiber (fill in for Dr. Price)    Final Scheduling Details   Colonoscopy prep sent?  N/A    Procedure scheduled  Upper endoscopy (EGD)    Surgeon:  silvano     Date of procedure:  3/28     Pre-OP / PAC:   No - Not required for this site.    Location  [facility] - Per order.    Sedation   Moderate Sedation - Per order.      Patient Reminders:   You will receive a call from a Nurse to review instructions and health history.  This assessment must be completed prior to your procedure.  Failure to complete the Nurse assessment may result in the procedure being cancelled.      On the day of your procedure, please designate an adult(s) who can drive you home stay with you for the next 24 hours. The medicines used in the exam will make you sleepy. You will not be able to drive.      You cannot take public transportation, ride share services, or non-medical taxi service without a responsible caregiver.  Medical transport services are allowed with the requirement that a responsible caregiver will receive you at your destination.  We require that drivers and caregivers are confirmed prior to your procedure.

## 2024-01-30 ENCOUNTER — TELEPHONE (OUTPATIENT)
Dept: GASTROENTEROLOGY | Facility: CLINIC | Age: 44
End: 2024-01-30
Payer: COMMERCIAL

## 2024-01-30 NOTE — TELEPHONE ENCOUNTER
Pre assessment completed for upcoming procedure.   (Please see previous telephone encounter notes for complete details)      Procedure details:    Arrival time and facility location reviewed.    Pre op exam needed? N/A    Designated  policy reviewed. Instructed to have someone stay 6  hours post procedure.     COVID policy reviewed.      Medication review:    Medications reviewed. Please see supporting documentation below. Holding recommendations discussed (if applicable).       Prep for procedure:     Procedure prep instructions reviewed.        Any additional information needed:  N/A      Patient  verbalized understanding and had no questions or concerns at this time.      Diamond Price RN  Endoscopy Procedure Pre Assessment RN  [phone] option 4

## 2024-01-30 NOTE — TELEPHONE ENCOUNTER
Pre visit planning completed.      Procedure details:    Patient scheduled for Upper endoscopy (EGD) on 2/5/2024.     Arrival time: 1030. Procedure time 1130    Pre op exam needed? N/A    Facility location: [facility]; [address] [place] [address], [address] Floor, [place] [address]    Sedation type: Conscious sedation     Indication for procedure: follow up varices with banding       Chart review:     Electronic implanted devices? No    Recent diagnosis of diverticulitis within the last 6 weeks? N/A    Diabetic? No    Diabetic medication HOLDING recommendations: (if applicable)  Oral diabetic medications: N/A  Diabetic injectables: N/A  Insulin: N/A      Medication review:    Anticoagulants? No    NSAIDS? No NSAID medications per patient's medication list.  RN will verify with pre-assessment call.    Other medication HOLDING recommendations:  N/A      Prep for procedure:     Prep instructions sent via ap Price RN  Endoscopy Procedure Pre Assessment RN  [phone] option 4

## 2024-01-30 NOTE — TELEPHONE ENCOUNTER
Caller: Kiel  Reason for Reschedule/Cancellation (please be detailed, any staff messages or encounters to note?): Farris out      Prior to reschedule please review:  Ordering Provider: JENNIFER SOSA   Sedation Determined: moderate  Does patient have any ASC Exclusions, please identify?: cirrhosis       Notes on Cancelled Procedure:  Procedure: Upper Endoscopy [EGD]   Date: 3/28  Location: [facility]; [address] [place] [address]  Surgeon: Brenton      Rescheduled: Yes  Procedure: Upper Endoscopy [EGD]   Date: 2/5  Location: [facility]; [address], [address] [place] [address]  Surgeon: Cheryl  Sedation Level Scheduled  CS,  Reason for Sedation Level order  Prep/Instructions updated and sent: y       Send In - basket message to Panc - [facility] Pool if EUS  procedure is canceled or rescheduled: [ N/A, YES or NO] n/a

## 2024-02-05 ENCOUNTER — HOSPITAL ENCOUNTER (OUTPATIENT)
Facility: CLINIC | Age: 44
Discharge: HOME | End: 2024-02-05
Attending: INTERNAL MEDICINE | Admitting: INTERNAL MEDICINE
Payer: COMMERCIAL

## 2024-02-05 VITALS
HEART RATE: 90 BPM | SYSTOLIC BLOOD PRESSURE: 141 MMHG | DIASTOLIC BLOOD PRESSURE: 83 MMHG | RESPIRATION RATE: 20 BRPM | OXYGEN SATURATION: 100 %

## 2024-02-05 DIAGNOSIS — K75.4 AUTOIMMUNE HEPATITIS (H): ICD-10-CM

## 2024-02-05 LAB
ALBUMIN SERPL BCG-MCNC: 3.8 G/DL (ref 3.5–5.2)
ALP SERPL-CCNC: 409 U/L (ref 40–150)
ALT SERPL W P-5'-P-CCNC: 41 U/L (ref 0–70)
AST SERPL W P-5'-P-CCNC: 78 U/L (ref 0–45)
BILIRUB DIRECT SERPL-MCNC: 0.75 MG/DL (ref 0–0.3)
BILIRUB SERPL-MCNC: 1.3 MG/DL
INR PPP: 1.24 (ref 0.85–1.15)
PROT SERPL-MCNC: 7.8 G/DL (ref 6.4–8.3)
UPPER GI ENDOSCOPY: NORMAL

## 2024-02-05 PROCEDURE — 88305 TISSUE EXAM BY PATHOLOGIST: CPT | Mod: 26 | Performed by: PATHOLOGY

## 2024-02-05 PROCEDURE — G0500 MOD SEDAT ENDO SERVICE >5YRS: HCPCS | Performed by: INTERNAL MEDICINE

## 2024-02-05 PROCEDURE — 88341 IMHCHEM/IMCYTCHM EA ADD ANTB: CPT | Mod: TC | Performed by: INTERNAL MEDICINE

## 2024-02-05 PROCEDURE — 80076 HEPATIC FUNCTION PANEL: CPT | Performed by: INTERNAL MEDICINE

## 2024-02-05 PROCEDURE — 43239 EGD BIOPSY SINGLE/MULTIPLE: CPT | Performed by: INTERNAL MEDICINE

## 2024-02-05 PROCEDURE — 43244 EGD VARICES LIGATION: CPT | Performed by: INTERNAL MEDICINE

## 2024-02-05 PROCEDURE — 36415 COLL VENOUS BLD VENIPUNCTURE: CPT | Performed by: INTERNAL MEDICINE

## 2024-02-05 PROCEDURE — 250N000009 HC RX 250: Performed by: INTERNAL MEDICINE

## 2024-02-05 PROCEDURE — 250N000011 HC RX IP 250 OP 636: Performed by: INTERNAL MEDICINE

## 2024-02-05 PROCEDURE — 88342 IMHCHEM/IMCYTCHM 1ST ANTB: CPT | Mod: 26 | Performed by: PATHOLOGY

## 2024-02-05 PROCEDURE — 88341 IMHCHEM/IMCYTCHM EA ADD ANTB: CPT | Mod: 26 | Performed by: PATHOLOGY

## 2024-02-05 PROCEDURE — 85610 PROTHROMBIN TIME: CPT | Performed by: INTERNAL MEDICINE

## 2024-02-05 RX ORDER — FENTANYL CITRATE 50 UG/ML
INJECTION, SOLUTION INTRAMUSCULAR; INTRAVENOUS PRN
Status: DISCONTINUED | OUTPATIENT
Start: 2024-02-05 | End: 2024-02-05 | Stop reason: HOSPADM

## 2024-02-05 ASSESSMENT — ACTIVITIES OF DAILY LIVING (ADL): ADLS_ACUITY_SCORE: 35

## 2024-02-05 NOTE — H&P
ENDOSCOPY PRE-SEDATION H&P FOR OUTPATIENT PROCEDURES    Kiel Davidson  [id number]  1980    Procedure: Endoscopy    Pre-procedure diagnosis: Varices    Past medical history:   Past Medical History:   Diagnosis Date    Autoimmune hepatitis (H)     Liver cirrhosis secondary to HOANG (H)      Patient Active Problem List   Diagnosis    Autoimmune hepatitis (H)       Past surgical history:   Past Surgical History:   Procedure Laterality Date    COLONOSCOPY N/A 12/28/2023    Procedure: COLONOSCOPY, WITH BIOPSY;  Surgeon: Elle Price MD;  Location:  GI    ESOPHAGOSCOPY, GASTROSCOPY, DUODENOSCOPY (EGD), COMBINED N/A 11/6/2020    Procedure: ESOPHAGOGASTRODUODENOSCOPY (EGD);  Surgeon: Luis Mir MD;  Location:  GI    GI SURGERY      EGD for liver cirrhosis    HERNIA REPAIR      as a child    IR LIVER BIOPSY PERCUTANEOUS  2/21/2023    liver biopsies      PERCUTANEOUS BIOPSY LIVER N/A 2/21/2023    Procedure: RANDOM NATIVE NEEDLE BIOPSY, LIVER, PERCUTANEOUS;  Surgeon: He Pickering MD;  Location: [facility] OR       No current facility-administered medications for this encounter.       Allergies   Allergen Reactions    Nuts Other (See Comments)     Patient also allergic to tree nuts-has swelling of the throat    Peanut-Containing Drug Products      Other reaction(s): Swelling       Physical Exam:    Mental status: alert  Heart: Normal  Lung: Normal  Assessment of patient's airway: Normal  Other as pertinent for procedure: None     Lab Results   Component Value Date    WBC 4.2 12/26/2023    WBC 3.9 06/29/2021     Lab Results   Component Value Date    RBC 3.76 12/26/2023    RBC 4.26 06/29/2021     Lab Results   Component Value Date    HGB 12.8 12/26/2023    HGB 13.5 06/29/2021     Lab Results   Component Value Date    HCT 37.8 12/26/2023    HCT 41.9 06/29/2021     Lab Results   Component Value Date     12/26/2023    MCV 98 06/29/2021     Lab Results   Component Value Date    MCH 34.0 12/26/2023     MCH 31.7 06/29/2021     Lab Results   Component Value Date    MCHC 33.9 12/26/2023    MCHC 32.2 06/29/2021     Lab Results   Component Value Date    RDW 15.4 12/26/2023    RDW 12.9 06/29/2021     Lab Results   Component Value Date     12/26/2023    PLT 93 06/29/2021     INR   Date Value Ref Range Status   12/26/2023 1.20 (H) 0.85 - 1.15 Final   06/29/2021 1.08 0.86 - 1.14 Final        ASA Score: See Provation note    Mallampati score:  I - Faucial pillars, soft palate, and uvula are visible    Assessment/Plan:     The patient is an appropriate candidate to receive sedation.    Informed consent was discussed with the patient/family, including the risks, benefits, potential complications and any alternative options associated with sedation.    Patient assessment completed just prior to sedation and while under constant observation by the provider. Condition determined to be adequate for proceeding with sedation.    The specific risks for the procedure were discussed with the patient at the time of informed consent and include but are not limited to perforation which could require surgery, missing significant neoplasm or lesion, hemorrhage and adverse sedative complication.      Aram Luna MD

## 2024-02-05 NOTE — OR NURSING
Patient had EGD with biopsies and variceal banding x 4 Patient tolerated procedure under conscious sedation and 2liters nasal cannula

## 2024-02-06 ENCOUNTER — TELEPHONE (OUTPATIENT)
Dept: GASTROENTEROLOGY | Facility: CLINIC | Age: 44
End: 2024-02-06

## 2024-02-06 DIAGNOSIS — K75.4 AUTOIMMUNE HEPATITIS (H): Primary | ICD-10-CM

## 2024-02-06 NOTE — TELEPHONE ENCOUNTER
Screening questions done 1/2/24    Final Scheduling Details   Colonoscopy prep sent?  N/A    Procedure scheduled  EGD    Surgeon:  Cheryl    Date of procedure:  4/15/24  Location  [facility]    Sedation   Moderate Sedation    Patient Reminders:  Patient must have an adult .  Patient cannot take any type of public or medical transportation alone.

## 2024-02-07 LAB
PATH REPORT.ADDENDUM SPEC: NORMAL
PATH REPORT.COMMENTS IMP SPEC: NORMAL
PATH REPORT.FINAL DX SPEC: NORMAL
PATH REPORT.GROSS SPEC: NORMAL
PATH REPORT.MICROSCOPIC SPEC OTHER STN: NORMAL
PATH REPORT.RELEVANT HX SPEC: NORMAL
PHOTO IMAGE: NORMAL

## 2024-02-08 NOTE — RESULT ENCOUNTER NOTE
Mr. Davidson:    At the time of your recent upper GI endoscopy, we noted a small ulcer in the antrum of your stomach. Biopsies did not show any concerning features, and there was no evidence of infection with H pylori or CMV.    As we discussed, I suggest that you take Omeprazole 20 mg twice a day. At your next endoscopy, the ulcer can be checked for healing.    If you have any questions about your liver disease care or tests, you can call the clinic at [phone].     - Dr. Luna

## 2024-04-01 ENCOUNTER — TELEPHONE (OUTPATIENT)
Dept: GASTROENTEROLOGY | Facility: CLINIC | Age: 44
End: 2024-04-01
Payer: COMMERCIAL

## 2024-04-01 NOTE — TELEPHONE ENCOUNTER
Pre assessment completed for upcoming procedure.   (Please see previous telephone encounter notes for complete details)    Patient  returned call.       Procedure details:    Arrival time and facility location reviewed.    Pre op exam needed? N/A    Designated  policy reviewed. Instructed to have someone stay 6  hours post procedure.       Medication review:    Medications reviewed. Please see supporting documentation below. Holding recommendations discussed (if applicable).       Prep for procedure:     Procedure prep instructions reviewed.        Any additional information needed:  Patient does not remember having any issues with the sedation either time. Patient was not sure why they had said that they only tolerated it fairly well and had no concerns with sedation.    Patient  verbalized understanding and had no questions or concerns at this time.      Nancy Shah RN  Endoscopy Procedure Pre Assessment RN  [phone] option 4

## 2024-04-01 NOTE — TELEPHONE ENCOUNTER
"Attempted to contact patient in order to complete pre assessment questions.     No answer. Left message to return call to [phone] option 4    Callback required communication sent via [employer].      Procedure details:    Patient scheduled for Upper endoscopy (EGD) on 4.15.24.     Arrival time: 1015. Procedure time 1115    Facility location: [facility]; [address] [place] [address], [address] Floor, [place] [address]. Check in location: Main entrance at registration desk.    Sedation type: Conscious sedation  Please discuss - last 2 EGD's were with moderate sedation - 2/2024 (tolerated \"fairly\" well) and 12/2023 (tolerated well).     Pre op exam needed? N/A    Indication for procedure: Follow up varices and antral ulcer       Chart review:     Electronic implanted devices? No    Recent diagnosis of diverticulitis within the last 6 weeks? N/A    Diabetic? No      Medication review:    Anticoagulants? No    NSAIDS? No    Other medication HOLDING recommendations:  N/A      Prep for procedure:       Prep instructions sent via [employer].       Bailee Barber RN  Endoscopy Procedure Pre Assessment RN         "

## 2024-04-15 ENCOUNTER — HOSPITAL ENCOUNTER (OUTPATIENT)
Facility: CLINIC | Age: 44
Discharge: HOME | End: 2024-04-15
Attending: INTERNAL MEDICINE | Admitting: INTERNAL MEDICINE
Payer: COMMERCIAL

## 2024-04-15 VITALS
RESPIRATION RATE: 16 BRPM | OXYGEN SATURATION: 100 % | SYSTOLIC BLOOD PRESSURE: 117 MMHG | DIASTOLIC BLOOD PRESSURE: 77 MMHG

## 2024-04-15 LAB — UPPER GI ENDOSCOPY: NORMAL

## 2024-04-15 PROCEDURE — 43239 EGD BIOPSY SINGLE/MULTIPLE: CPT | Performed by: INTERNAL MEDICINE

## 2024-04-15 PROCEDURE — 999N000099 HC STATISTIC MODERATE SEDATION < 10 MIN: Performed by: INTERNAL MEDICINE

## 2024-04-15 PROCEDURE — 88305 TISSUE EXAM BY PATHOLOGIST: CPT | Mod: TC | Performed by: INTERNAL MEDICINE

## 2024-04-15 PROCEDURE — 250N000011 HC RX IP 250 OP 636: Performed by: INTERNAL MEDICINE

## 2024-04-15 PROCEDURE — 88305 TISSUE EXAM BY PATHOLOGIST: CPT | Mod: 26 | Performed by: STUDENT IN AN ORGANIZED HEALTH CARE EDUCATION/TRAINING PROGRAM

## 2024-04-15 RX ORDER — FENTANYL CITRATE 50 UG/ML
INJECTION, SOLUTION INTRAMUSCULAR; INTRAVENOUS PRN
Status: DISCONTINUED | OUTPATIENT
Start: 2024-04-15 | End: 2024-04-15 | Stop reason: HOSPADM

## 2024-04-15 ASSESSMENT — ACTIVITIES OF DAILY LIVING (ADL)
ADLS_ACUITY_SCORE: 35

## 2024-04-15 NOTE — OR NURSING
Pt tolerated EGD with biopsies, very well, under conscious sedation. No banding was required. Pt returned to recovery ion RA

## 2024-04-15 NOTE — H&P
ENDOSCOPY PRE-SEDATION H&P FOR OUTPATIENT PROCEDURES    Kiel Davidson  [id number]  1980    Procedure: Endoscopy    Pre-procedure diagnosis: Varices, ulcer    Past medical history:   Past Medical History:   Diagnosis Date    Autoimmune hepatitis (H)     Liver cirrhosis secondary to HOANG (H)      Patient Active Problem List   Diagnosis    Autoimmune hepatitis (H)       Past surgical history:   Past Surgical History:   Procedure Laterality Date    COLONOSCOPY N/A 12/28/2023    Procedure: COLONOSCOPY, WITH BIOPSY;  Surgeon: Elle Price MD;  Location:  GI    ESOPHAGOSCOPY, GASTROSCOPY, DUODENOSCOPY (EGD), COMBINED N/A 11/6/2020    Procedure: ESOPHAGOGASTRODUODENOSCOPY (EGD);  Surgeon: Luis Mir MD;  Location:  GI    ESOPHAGOSCOPY, GASTROSCOPY, DUODENOSCOPY (EGD), COMBINED N/A 2/5/2024    Procedure: ESOPHAGOGASTRODUODENOSCOPY, WITH BIOPSY;  Surgeon: Aram Luna MD;  Location:  GI    GI SURGERY      EGD for liver cirrhosis    HERNIA REPAIR      as a child    IR LIVER BIOPSY PERCUTANEOUS  2/21/2023    liver biopsies      PERCUTANEOUS BIOPSY LIVER N/A 2/21/2023    Procedure: RANDOM NATIVE NEEDLE BIOPSY, LIVER, PERCUTANEOUS;  Surgeon: He Pickering MD;  Location: [facility] OR       No current facility-administered medications for this encounter.       Allergies   Allergen Reactions    Nuts Other (See Comments)     Patient also allergic to tree nuts-has swelling of the throat    Peanut-Containing Drug Products      Other reaction(s): Swelling         Physical Exam:    Mental status: alert  Heart: Normal  Lung: Normal  Assessment of patient's airway: Normal  Other as pertinent for procedure: None     Lab Results   Component Value Date    WBC 4.2 12/26/2023    WBC 3.9 06/29/2021     Lab Results   Component Value Date    RBC 3.76 12/26/2023    RBC 4.26 06/29/2021     Lab Results   Component Value Date    HGB 12.8 12/26/2023    HGB 13.5 06/29/2021     Lab Results   Component Value Date     HCT 37.8 12/26/2023    HCT 41.9 06/29/2021     Lab Results   Component Value Date     12/26/2023    MCV 98 06/29/2021     Lab Results   Component Value Date    MCH 34.0 12/26/2023    MCH 31.7 06/29/2021     Lab Results   Component Value Date    MCHC 33.9 12/26/2023    MCHC 32.2 06/29/2021     Lab Results   Component Value Date    RDW 15.4 12/26/2023    RDW 12.9 06/29/2021     Lab Results   Component Value Date     12/26/2023    PLT 93 06/29/2021     INR   Date Value Ref Range Status   02/05/2024 1.24 (H) 0.85 - 1.15 Final   06/29/2021 1.08 0.86 - 1.14 Final        ASA Score: See Provation note    Mallampati score:  I - Faucial pillars, soft palate, and uvula are visible    Assessment/Plan:     The patient is an appropriate candidate to receive sedation.    Informed consent was discussed with the patient/family, including the risks, benefits, potential complications and any alternative options associated with sedation.    Patient assessment completed just prior to sedation and while under constant observation by the provider. Condition determined to be adequate for proceeding with sedation.    The specific risks for the procedure were discussed with the patient at the time of informed consent and include but are not limited to perforation which could require surgery, missing significant neoplasm or lesion, hemorrhage and adverse sedative complication.      Aram Luna MD

## 2024-04-23 NOTE — RESULT ENCOUNTER NOTE
Mr. Davidson:    At the time of your recent upper GI endoscopy, we took biopsies from the antrum of the stomach.  These biopsies did not show any concerning abnormalities.    I suggest that you follow-up in hepatology clinic as scheduled. If you have any questions about your liver disease care or tests, you can call the clinic at [phone].     - Dr. Luna

## 2024-04-30 DIAGNOSIS — K75.4 AUTOIMMUNE HEPATITIS (H): ICD-10-CM

## 2024-04-30 DIAGNOSIS — R94.5 ABNORMAL RESULTS OF LIVER FUNCTION STUDIES: ICD-10-CM

## 2024-04-30 RX ORDER — AZATHIOPRINE 50 MG/1
100 TABLET ORAL DAILY
Qty: 180 TABLET | Refills: 1 | Status: SHIPPED | OUTPATIENT
Start: 2024-04-30

## 2024-05-08 DIAGNOSIS — K75.4 AUTOIMMUNE HEPATITIS (H): ICD-10-CM

## 2024-06-10 NOTE — OP NOTE
PROCEDURES 2/21/2023:  1. Ultrasound guided percutaneous liver biopsy.     Clinical History: Increased liver enzymes on a background of autoimmune hepatitis. Biopsy requested.    Comparisons: Outside ultrasound dated 1/18/2023    Staff Radiologist: MD SERG Rodriguez, ALEKSANDR GHOTRA MD, attest that I was present in the procedure room for the entire procedure.     Assistant: Eligio Ruiz PA-C    Medications: The patient was placed on continuous monitoring. Procedure was performed under monitored anesthesia care provided by anesthesiology service. For details please refer to their dedicated note. The patient remained stable throughout the procedure.     PROCEDURE: The patient understood the limitations, alternatives, and risks of the procedure and requested the procedure be performed. Both written and oral consent were obtained.    Limited pre-procedural scan performed demonstrated adequate liver position for biopsy.    The right upper quadrant was prepped and draped in the usual sterile fashion. Ten to one volume mixture of 1% lidocaine without epinephrine buffered with 8.4% bicarbonate solution was used for local anesthesia.     Under ultrasound guidance, a 17 gauge coaxial introducer needle was advanced into the left hepatic lobe via a subcostal approach. Permanent copy of the image documenting the needle position was entered into the patients record.     Two 18 gauge core biopsies were obtained with a Neverfail biopsy gun and submitted to Pathology in formalin.    Good hemostasis was achieved with Gelfoam plugs that were deployed as the needle was removed. Sterile dressing applied. No blood loss.    Limited post-procedural scan demonstrated no immediate complication.     IMPRESSION:   1. Two 18 gauge core biopsies obtained of the left hepatic lobe under ultrasound guidance.       Wellness Visit for Adults   AMBULATORY CARE:   A wellness visit  is when you see your healthcare provider to get screened for health problems. Your healthcare provider will also give you advice on how to stay healthy. Write down your questions so you remember to ask them. Ask your healthcare provider how often you should have a wellness visit.  What happens at a wellness visit:  Your healthcare provider will ask about your health, and your family history of health problems. This includes high blood pressure, heart disease, and cancer. He or she will ask if you have symptoms that concern you, if you smoke, and about your mood. You may also be asked about your intake of medicines, supplements, food, and alcohol. Any of the following may be done:  Your weight  will be checked. Your height may also be checked so your body mass index (BMI) can be calculated. Your BMI shows if you are at a healthy weight.    Your blood pressure  and heart rate will be checked. Your temperature may also be checked.    Blood and urine tests  may be done. Blood tests may be done to check your cholesterol levels. Abnormal cholesterol levels increase your risk for heart disease and stroke. You may also need a blood or urine test to check for diabetes if you are at increased risk. Urine tests may be done to look for signs of an infection or kidney disease.    A physical exam  includes checking your heartbeat and lungs with a stethoscope. Your healthcare provider may also check your skin to look for sun damage.    Screening tests  may be recommended. A screening test is done to check for diseases that may not cause symptoms. The screening tests you may need depend on your age, gender, family history, and lifestyle habits. For example, colorectal screening may be recommended if you are 50 years old or older.    Screening tests you need if you are a woman:   A Pap smear  is used to screen for cervical cancer. Pap smears are usually done every 3 to  5 years depending on your age. You may need them more often if you have had abnormal Pap smear test results in the past. Ask your healthcare provider how often you should have a Pap smear.    A mammogram  is an x-ray of your breasts to screen for breast cancer. Experts recommend mammograms every 2 years starting at age 50 years. You may need a mammogram at age 49 years or younger if you have an increased risk for breast cancer. Talk to your healthcare provider about when you should start having mammograms and how often you need them.    Vaccines you may need:   Get an influenza vaccine  every year. The influenza vaccine protects you from the flu. Several types of viruses cause the flu. The viruses change over time, so new vaccines are made each year.    Get a tetanus-diphtheria (Td) booster vaccine  every 10 years. This vaccine protects you against tetanus and diphtheria. Tetanus is a severe infection that may cause painful muscle spasms and lockjaw. Diphtheria is a severe bacterial infection that causes a thick covering in the back of your mouth and throat.    Get a human papillomavirus (HPV) vaccine  if you are female and aged 19 to 26 or male 19 to 21 and never received it. This vaccine protects you from HPV infection. HPV is the most common infection spread by sexual contact. HPV may also cause vaginal, penile, and anal cancers.    Get a pneumococcal vaccine  if you are aged 65 years or older. The pneumococcal vaccine is an injection given to protect you from pneumococcal disease. Pneumococcal disease is an infection caused by pneumococcal bacteria. The infection may cause pneumonia, meningitis, or an ear infection.    Get a shingles vaccine  if you are 60 or older, even if you have had shingles before. The shingles vaccine is an injection to protect you from the varicella-zoster virus. This is the same virus that causes chickenpox. Shingles is a painful rash that develops in people who had chickenpox or have  been exposed to the virus.    How to eat healthy:  My Plate is a model for planning healthy meals. It shows the types and amounts of foods that should go on your plate. Fruits and vegetables make up about half of your plate, and grains and protein make up the other half. A serving of dairy is included on the side of your plate. The amount of calories and serving sizes you need depends on your age, gender, weight, and height. Examples of healthy foods are listed below:  Eat a variety of vegetables  such as dark green, red, and orange vegetables. You can also include canned vegetables low in sodium (salt) and frozen vegetables without added butter or sauces.    Eat a variety of fresh fruits , canned fruit in 100% juice, frozen fruit, and dried fruit.    Include whole grains.  At least half of the grains you eat should be whole grains. Examples include whole-wheat bread, wheat pasta, brown rice, and whole-grain cereals such as oatmeal.    Eat a variety of protein foods such as seafood (fish and shellfish), lean meat, and poultry without skin (turkey and chicken). Examples of lean meats include pork leg, shoulder, or tenderloin, and beef round, sirloin, tenderloin, and extra lean ground beef. Other protein foods include eggs and egg substitutes, beans, peas, soy products, nuts, and seeds.    Choose low-fat dairy products such as skim or 1% milk or low-fat yogurt, cheese, and cottage cheese.    Limit unhealthy fats  such as butter, hard margarine, and shortening.       Exercise:  Exercise at least 30 minutes per day on most days of the week. Some examples of exercise include walking, biking, dancing, and swimming. You can also fit in more physical activity by taking the stairs instead of the elevator or parking farther away from stores. Include muscle strengthening activities 2 days each week. Regular exercise provides many health benefits. It helps you manage your weight, and decreases your risk for type 2 diabetes,  heart disease, stroke, and high blood pressure. Exercise can also help improve your mood. Ask your healthcare provider about the best exercise plan for you.       General health and safety guidelines:   Do not smoke.  Nicotine and other chemicals in cigarettes and cigars can cause lung damage. Ask your healthcare provider for information if you currently smoke and need help to quit. E-cigarettes or smokeless tobacco still contain nicotine. Talk to your healthcare provider before you use these products.    Limit alcohol.  A drink of alcohol is 12 ounces of beer, 5 ounces of wine, or 1½ ounces of liquor.    Lose weight, if needed.  Being overweight increases your risk of certain health conditions. These include heart disease, high blood pressure, type 2 diabetes, and certain types of cancer.    Protect your skin.  Do not sunbathe or use tanning beds. Use sunscreen with a SPF 15 or higher. Apply sunscreen at least 15 minutes before you go outside. Reapply sunscreen every 2 hours. Wear protective clothing, hats, and sunglasses when you are outside.    Drive safely.  Always wear your seatbelt. Make sure everyone in your car wears a seatbelt. A seatbelt can save your life if you are in an accident. Do not use your cell phone when you are driving. This could distract you and cause an accident. Pull over if you need to make a call or send a text message.    Practice safe sex.  Use latex condoms if are sexually active and have more than one partner. Your healthcare provider may recommend screening tests for sexually transmitted infections (STIs).    Wear helmets, lifejackets, and protective gear.  Always wear a helmet when you ride a bike or motorcycle, go skiing, or play sports that could cause a head injury. Wear protective equipment when you play sports. Wear a lifejacket when you are on a boat or doing water sports.    © Copyright [employer] 2023 Information is for End User's use only and may not be sold, redistributed or  otherwise used for commercial purposes.  The above information is an  only. It is not intended as medical advice for individual conditions or treatments. Talk to your doctor, nurse or pharmacist before following any medical regimen to see if it is safe and effective for you.

## 2024-07-14 ENCOUNTER — HEALTH MAINTENANCE LETTER (OUTPATIENT)
Age: 44
End: 2024-07-14

## 2024-10-01 DIAGNOSIS — K75.4 AUTOIMMUNE HEPATITIS (H): ICD-10-CM

## 2024-10-01 DIAGNOSIS — R94.5 ABNORMAL RESULTS OF LIVER FUNCTION STUDIES: ICD-10-CM

## 2024-10-01 RX ORDER — URSODIOL 300 MG/1
CAPSULE ORAL
Qty: 270 CAPSULE | Refills: 3 | Status: SHIPPED | OUTPATIENT
Start: 2024-10-01

## 2024-10-29 DIAGNOSIS — K75.4 AUTOIMMUNE HEPATITIS (H): ICD-10-CM

## 2024-11-04 DIAGNOSIS — K75.4 AUTOIMMUNE HEPATITIS (H): ICD-10-CM

## 2024-11-04 DIAGNOSIS — R94.5 ABNORMAL RESULTS OF LIVER FUNCTION STUDIES: ICD-10-CM

## 2024-11-04 RX ORDER — AZATHIOPRINE 50 MG/1
100 TABLET ORAL DAILY
Qty: 180 TABLET | Refills: 0 | Status: SHIPPED | OUTPATIENT
Start: 2024-11-04

## 2024-12-24 DIAGNOSIS — K74.60 CIRRHOSIS OF LIVER (H): ICD-10-CM

## 2024-12-24 DIAGNOSIS — K75.4 AUTOIMMUNE HEPATITIS (H): Primary | ICD-10-CM

## 2025-01-06 ENCOUNTER — TELEPHONE (OUTPATIENT)
Dept: GASTROENTEROLOGY | Facility: CLINIC | Age: 45
End: 2025-01-06

## 2025-01-06 NOTE — TELEPHONE ENCOUNTER
Was the patient contacted by phone and reminded of the upcoming visit? message left    Were ordered labs and tests completed prior to the appointment? Lab scheduled at local clinic this afternoon. Message left that his local clinic does not run their own labs so we will not have results for appointment tomorrow. Asked him to get drawn this morning or make 8 am lab appointment at the [facility] so results are available for appointment.     Were the needed lab orders placed? Yes    Sandra Hodge, [facility]  1/6/2025 8:47 AM

## 2025-01-07 ENCOUNTER — LAB (OUTPATIENT)
Dept: LAB | Facility: CLINIC | Age: 45
End: 2025-01-07
Attending: STUDENT IN AN ORGANIZED HEALTH CARE EDUCATION/TRAINING PROGRAM
Payer: COMMERCIAL

## 2025-01-07 ENCOUNTER — OFFICE VISIT (OUTPATIENT)
Dept: GASTROENTEROLOGY | Facility: CLINIC | Age: 45
End: 2025-01-07
Payer: COMMERCIAL

## 2025-01-07 VITALS
HEART RATE: 93 BPM | OXYGEN SATURATION: 99 % | WEIGHT: 150.8 LBS | TEMPERATURE: 98.1 F | SYSTOLIC BLOOD PRESSURE: 149 MMHG | DIASTOLIC BLOOD PRESSURE: 74 MMHG | HEIGHT: 68 IN | BODY MASS INDEX: 22.85 KG/M2 | RESPIRATION RATE: 18 BRPM

## 2025-01-07 DIAGNOSIS — K83.01 PSC (PRIMARY SCLEROSING CHOLANGITIS) (H): Primary | ICD-10-CM

## 2025-01-07 DIAGNOSIS — K83.01 PSC (PRIMARY SCLEROSING CHOLANGITIS) (H): ICD-10-CM

## 2025-01-07 DIAGNOSIS — K74.60 CIRRHOSIS OF LIVER (H): ICD-10-CM

## 2025-01-07 DIAGNOSIS — K75.4 AUTOIMMUNE HEPATITIS (H): ICD-10-CM

## 2025-01-07 LAB
AFP SERPL-MCNC: 2.4 NG/ML
ALBUMIN SERPL BCG-MCNC: 3.6 G/DL (ref 3.5–5.2)
ALP SERPL-CCNC: 223 U/L (ref 40–150)
ALT SERPL W P-5'-P-CCNC: 30 U/L (ref 0–70)
ANION GAP SERPL CALCULATED.3IONS-SCNC: 8 MMOL/L (ref 7–15)
AST SERPL W P-5'-P-CCNC: 58 U/L (ref 0–45)
BILIRUB DIRECT SERPL-MCNC: 1.71 MG/DL (ref 0–0.3)
BILIRUB SERPL-MCNC: 2.9 MG/DL
BUN SERPL-MCNC: 10.9 MG/DL (ref 6–20)
CALCIUM SERPL-MCNC: 9 MG/DL (ref 8.8–10.4)
CHLORIDE SERPL-SCNC: 105 MMOL/L (ref 98–107)
CREAT SERPL-MCNC: 0.78 MG/DL (ref 0.67–1.17)
EGFRCR SERPLBLD CKD-EPI 2021: >90 ML/MIN/1.73M2
ERYTHROCYTE [DISTWIDTH] IN BLOOD BY AUTOMATED COUNT: 15.7 % (ref 10–15)
GLUCOSE SERPL-MCNC: 132 MG/DL (ref 70–99)
HCO3 SERPL-SCNC: 25 MMOL/L (ref 22–29)
HCT VFR BLD AUTO: 35.7 % (ref 40–53)
HGB BLD-MCNC: 12 G/DL (ref 13.3–17.7)
INR PPP: 1.31 (ref 0.85–1.15)
MCH RBC QN AUTO: 35.1 PG (ref 26.5–33)
MCHC RBC AUTO-ENTMCNC: 33.6 G/DL (ref 31.5–36.5)
MCV RBC AUTO: 104 FL (ref 78–100)
PLATELET # BLD AUTO: 50 10E3/UL (ref 150–450)
POTASSIUM SERPL-SCNC: 3.7 MMOL/L (ref 3.4–5.3)
PROT SERPL-MCNC: 7.8 G/DL (ref 6.4–8.3)
RBC # BLD AUTO: 3.42 10E6/UL (ref 4.4–5.9)
SODIUM SERPL-SCNC: 138 MMOL/L (ref 135–145)
WBC # BLD AUTO: 2.5 10E3/UL (ref 4–11)

## 2025-01-07 PROCEDURE — 80053 COMPREHEN METABOLIC PANEL: CPT | Performed by: PATHOLOGY

## 2025-01-07 PROCEDURE — 99214 OFFICE O/P EST MOD 30 MIN: CPT | Performed by: INTERNAL MEDICINE

## 2025-01-07 PROCEDURE — 85027 COMPLETE CBC AUTOMATED: CPT | Performed by: PATHOLOGY

## 2025-01-07 PROCEDURE — 99417 PROLNG OP E/M EACH 15 MIN: CPT | Performed by: INTERNAL MEDICINE

## 2025-01-07 PROCEDURE — 82105 ALPHA-FETOPROTEIN SERUM: CPT | Performed by: INTERNAL MEDICINE

## 2025-01-07 PROCEDURE — 86301 IMMUNOASSAY TUMOR CA 19-9: CPT | Mod: 90 | Performed by: PATHOLOGY

## 2025-01-07 PROCEDURE — 99215 OFFICE O/P EST HI 40 MIN: CPT | Performed by: INTERNAL MEDICINE

## 2025-01-07 PROCEDURE — 99000 SPECIMEN HANDLING OFFICE-LAB: CPT | Performed by: PATHOLOGY

## 2025-01-07 PROCEDURE — 85610 PROTHROMBIN TIME: CPT | Performed by: PATHOLOGY

## 2025-01-07 PROCEDURE — 82248 BILIRUBIN DIRECT: CPT | Performed by: PATHOLOGY

## 2025-01-07 PROCEDURE — 36415 COLL VENOUS BLD VENIPUNCTURE: CPT | Performed by: PATHOLOGY

## 2025-01-07 PROCEDURE — G2211 COMPLEX E/M VISIT ADD ON: HCPCS | Performed by: INTERNAL MEDICINE

## 2025-01-07 ASSESSMENT — PAIN SCALES - GENERAL: PAINLEVEL_OUTOF10: NO PAIN (0)

## 2025-01-07 NOTE — PROGRESS NOTES
[facility]   Liver Clinic Follow Up     A/P  Mr. 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  ANCA +  MELD 13 ABO O    He is on azathioprine 100, eligio 600/300. Prednisone has been off for several years. He has had mild elevations in transaminases/AP.  Last imaging US 1/6/25 unremarkable  Last MRI 9/1/23 changes c/w PSC    Now with some slight increase in LFTs (last labs prior to today 1 y ago) with MELD 13. We will recheck his labs in a couple of weeks.  He will need MRI at some point in the next few/several months but will wait to see what the labs are to determine if it needs to be moved up.    We spent time today discussing   -indications for liver transplant and timing  -MELD is only one piece that goes in to decision-making but it is an important piece and is the  for access to DDLT  -the uncertainty of timing with progression of liver disease, especially with PSC  -the evaluation for elevated LFTs pending his course may include repeat liver biopsy to evaluate for AIH activity and/or MRCP to assess for dominant stricture  -LDLT    HCC screening US 1/7/25 no mass. Last MRI/MRCP 9/2023 without mass. Ca19-9 and AFP normal in past. Today's pending. Have discussed alternating MR/US for cancer screening.  Varices Last 4/2024 no varices. Banded in past (12/28/23) Reepat 4/2025. Ordered.  Evaluation for IBD Colonoscopy 12/28/23 normal, with biopsies negative for IBD  Gastric ulcer has been on PPI. Will recheck with EGD this spring. If resolved and not symptomatic, ok to stop PPI.  Bone density Normal in 2017.        Elle Price MD  Hepatology/Liver Transplant  Medical Director, Liver Transplantation  [facility]  ========================================================================  S:   Mr. Davidson is a 44 Y M with  cirrhosis related to AIH dx 2014 and with PSC diagnosed 2023 based on biopsy and MRCP. He is here with his wife Leatha.    History of liver disease  -2014 He was originally diagnosed with liver disease around 2014 when he had imaging that suggested cirrhosis (imaging done for evaluation of hematuria). Biopsy at that time showed cirrhosis with no particular features indicating etiology. My assessment was probable burnt out AIH. Labs (ALT AST AP) were elevated about 2x normal at that time.       -2017 Liver tests became markedly elevated 1/2017:  , , TB 5.     Liver biopsy 7/20/17  Sinusoidal fibrosis, cholestatic hepatitis. There was a moderately dense inflammatory infiltrate of lymphocytes, macrophages, eosinophils and plasma cells.  which could not be excluded, but it was not classic for it. He was tested for antibodies in 2015 and 2014, which were negative.    He was started on prednisone and Imuran . He responded well to this and prednisone was eventually stopped.    -2022-23 LFTs became more elevated  ALT increase from 1.5x nl to 4-5x nl  AST same pattern  AP had been elevated prior  3 y, 177-329.  TB nl  Because of elevations in LFTs, he was started on prednisone 40 mg 1/27/23 by my partner and azathioprine was increased from 75 to 100. This did not improved his LFTs. He continued on eligio.  Liver biopsy 2/17/23  Inactive cirrhosis, Laennec stage 4A:   -Consistent with autoimmune hepatitis currently quiescent on treatment   -Subtle portal neutrophils of unclear significance  The sample size is adequate but shows no morphologic explanation for ALT of 198,  or , although these numbers predated this biopsy by more than a week. The sample shows with the trichrome and reticulin stains, advanced fibrosis with cirrhotic regenerative nodules. Otherwise there is no active portal, interface, or lobular inflammation. Steatosis, congestion, necrosis are all absent. The fibrotic portal areas  shows more than usual aggregates of neutrophils in an otherwise quiescent parenchymal context. It is unclear what this represents but imaging to rule out the possibility of an evolving biliary tree overlap (eg overlapping PSC) should be considered.     Bile ducts are present in normal numbers and architecture. Iron stain is negative for stainable parenchymal or Kupffer iron. The PAS and PAS-D stains show no abnormal alpha-1-antitrypsin profile or other cytoplasmic accumulations.      MRCP 9/1/23  1. Cirrhotic liver morphology and portal hypertension including splenomegaly and small upper abdominal varices.  2. No suspicious hepatic observation.  3. Limited MRCP sequences. Within this limitation, intrahepatic biliary beading and several focally dilated biliary radicles, which can be seen in the setting of PSC.    He has been maintained on eligio 600/300, azathioprine 100 mg.    Last labs prior to today were 1 y ago   5/16/23 7/31/23 12/26/23 2/5/24 1/7/24     144  72  78          58    187  34  41          30   TB 0.7  1.0  1.5  1.3         2.9  AP  179  236  430  409        223  Plt   106  120     50  Lab Test 01/07/25  0751   PROTTOTAL 7.8   ALBUMIN 3.6   BILITOTAL 2.9*   ALKPHOS 223*   AST 58*   ALT 30     Lab Test 01/07/25  0751   WBC 2.5*   RBC 3.42*   HGB 12.0*   HCT 35.7*   *   MCH 35.1*   MCHC 33.6   RDW 15.7*   PLT 50*     MELD 3.0: 13 at 1/7/2025  7:51 AM  MELD-Na: 13 at 1/7/2025  7:51 AM  Calculated from:  Serum Creatinine: 0.78 mg/dL (Using min of 1 mg/dL) at 1/7/2025  7:51 AM  Serum Sodium: 138 mmol/L (Using max of 137 mmol/L) at 1/7/2025  7:51 AM  Total Bilirubin: 2.9 mg/dL at 1/7/2025  7:51 AM  Serum Albumin: 3.6 g/dL (Using max of 3.5 g/dL) at 1/7/2025  7:51 AM  INR(ratio): 1.31 at 1/7/2025  7:51 AM  Age at listing (hypothetical): 44 years  Sex: Male at 1/7/2025  7:51 AM        SHx: Twins Erin and Jonathan are in  (2024)     Exam  BP (!) 149/74 (BP Location: Left arm,  "Patient Position: Sitting, Cuff Size: Adult Regular)   Pulse 93   Temp 98.1  F (36.7  C) (Oral)   Resp 18   Ht 1.727 m (5' 7.99\")   Wt 68.4 kg (150 lb 12.8 oz)   SpO2 99%   BMI 22.93 kg/m      Gen Alert pleasant NAD  Resp No difficulty breathing. No cough  Skin No Jaundice  Eyes No icterus  Neuro HOOPER  MSK no muscle wasting  Psyche Pleasant, appropriate. Well groomed.    Time today in minutes 100  Record review 15  Communication with care team 0  Face to face with patient 65  Documentation 20    The longitudinal plan of care for the diagnosis(es)/condition(s) as documented were addressed during this visit. Due to the added complexity in care, I will continue to support Annalise in the subsequent management and with ongoing continuity of care.        "

## 2025-01-07 NOTE — NURSING NOTE
"Chief Complaint   Patient presents with    RECHECK     Autoimmune hepatitis      Vital signs:  Temp: 98.1  F (36.7  C) Temp src: Oral BP: (!) 149/74 Pulse: 93   Resp: 18 SpO2: 99 %     Height: 172.7 cm (5' 7.99\") Weight: 68.4 kg (150 lb 12.8 oz)  Estimated body mass index is 22.93 kg/m  as calculated from the following:    Height as of this encounter: 1.727 m (5' 7.99\").    Weight as of this encounter: 68.4 kg (150 lb 12.8 oz).      Sandra Hodge, [facility]  1/7/2025 8:35 AM    "

## 2025-01-07 NOTE — LETTER
1/7/2025      Kiel Davidson  [address] [facility] [address]      Dear Colleague,    Thank you for referring your patient, Kiel Davidson, to the [facility] [place]. Please see a copy of my visit note below.    [facility]   Liver Clinic Follow Up     A/P  Mr. 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  ANCA +  MELD 13 ABO O    He is on azathioprine 100, eligio 600/300. Prednisone has been off for several years. He has had mild elevations in transaminases/AP.  Last imaging US 1/6/25 unremarkable  Last MRI 9/1/23 changes c/w PSC    Now with some slight increase in LFTs (last labs prior to today 1 y ago) with MELD 13. We will recheck his labs in a couple of weeks.  He will need MRI at some point in the next few/several months but will wait to see what the labs are to determine if it needs to be moved up.    We spent time today discussing   -indications for liver transplant and timing  -MELD is only one piece that goes in to decision-making but it is an important piece and is the  for access to DDLT  -the uncertainty of timing with progression of liver disease, especially with PSC  -the evaluation for elevated LFTs pending his course may include repeat liver biopsy to evaluate for AIH activity and/or MRCP to assess for dominant stricture  -LDLT    HCC screening US 1/7/25 no mass. Last MRI/MRCP 9/2023 without mass. Ca19-9 and AFP normal in past. Today's pending. Have discussed alternating MR/US for cancer screening.  Varices Last 4/2024 no varices. Banded in past (12/28/23) Reepat 4/2025. Ordered.  Evaluation for IBD Colonoscopy 12/28/23 normal, with biopsies negative for IBD  Gastric ulcer has been on PPI. Will recheck with EGD this spring. If resolved and not symptomatic, ok to stop PPI.  Bone density  Normal in 2017.        Elle Price MD  Hepatology/Liver Transplant  Medical Director, Liver Transplantation  [facility]  ========================================================================  S:   Mr. Davidson is a 44 Y M with cirrhosis related to AIH dx 2014 and with PSC diagnosed 2023 based on biopsy and MRCP. He is here with his wife Leatha.    History of liver disease  -2014 He was originally diagnosed with liver disease around 2014 when he had imaging that suggested cirrhosis (imaging done for evaluation of hematuria). Biopsy at that time showed cirrhosis with no particular features indicating etiology. My assessment was probable burnt out AIH. Labs (ALT AST AP) were elevated about 2x normal at that time.       -2017 Liver tests became markedly elevated 1/2017:  , , TB 5.     Liver biopsy 7/20/17  Sinusoidal fibrosis, cholestatic hepatitis. There was a moderately dense inflammatory infiltrate of lymphocytes, macrophages, eosinophils and plasma cells.  which could not be excluded, but it was not classic for it. He was tested for antibodies in 2015 and 2014, which were negative.    He was started on prednisone and Imuran . He responded well to this and prednisone was eventually stopped.    -2022-23 LFTs became more elevated  ALT increase from 1.5x nl to 4-5x nl  AST same pattern  AP had been elevated prior  3 y, 177-329.  TB nl  Because of elevations in LFTs, he was started on prednisone 40 mg 1/27/23 by my partner and azathioprine was increased from 75 to 100. This did not improved his LFTs. He continued on eligio.  Liver biopsy 2/17/23  Inactive cirrhosis, Laennec stage 4A:   -Consistent with autoimmune hepatitis currently quiescent on treatment   -Subtle portal neutrophils of unclear significance  The sample size is adequate but shows no morphologic explanation for ALT of 198,  or , although these numbers predated this biopsy by more than a week. The sample  shows with the trichrome and reticulin stains, advanced fibrosis with cirrhotic regenerative nodules. Otherwise there is no active portal, interface, or lobular inflammation. Steatosis, congestion, necrosis are all absent. The fibrotic portal areas shows more than usual aggregates of neutrophils in an otherwise quiescent parenchymal context. It is unclear what this represents but imaging to rule out the possibility of an evolving biliary tree overlap (eg overlapping PSC) should be considered.     Bile ducts are present in normal numbers and architecture. Iron stain is negative for stainable parenchymal or Kupffer iron. The PAS and PAS-D stains show no abnormal alpha-1-antitrypsin profile or other cytoplasmic accumulations.      MRCP 9/1/23  1. Cirrhotic liver morphology and portal hypertension including splenomegaly and small upper abdominal varices.  2. No suspicious hepatic observation.  3. Limited MRCP sequences. Within this limitation, intrahepatic biliary beading and several focally dilated biliary radicles, which can be seen in the setting of PSC.    He has been maintained on eligio 600/300, azathioprine 100 mg.    Last labs prior to today were 1 y ago   5/16/23 7/31/23 12/26/23 2/5/24 1/7/24     144  72  78          58    187  34  41          30   TB 0.7  1.0  1.5  1.3         2.9  AP  179  236  430  409        223  Plt   106  120     50  Lab Test 01/07/25  0751   PROTTOTAL 7.8   ALBUMIN 3.6   BILITOTAL 2.9*   ALKPHOS 223*   AST 58*   ALT 30     Lab Test 01/07/25  0751   WBC 2.5*   RBC 3.42*   HGB 12.0*   HCT 35.7*   *   MCH 35.1*   MCHC 33.6   RDW 15.7*   PLT 50*     MELD 3.0: 13 at 1/7/2025  7:51 AM  MELD-Na: 13 at 1/7/2025  7:51 AM  Calculated from:  Serum Creatinine: 0.78 mg/dL (Using min of 1 mg/dL) at 1/7/2025  7:51 AM  Serum Sodium: 138 mmol/L (Using max of 137 mmol/L) at 1/7/2025  7:51 AM  Total Bilirubin: 2.9 mg/dL at 1/7/2025  7:51 AM  Serum Albumin: 3.6 g/dL (Using max of 3.5  "g/dL) at 1/7/2025  7:51 AM  INR(ratio): 1.31 at 1/7/2025  7:51 AM  Age at listing (hypothetical): 44 years  Sex: Male at 1/7/2025  7:51 AM        SHx: Twins Erin and Jonathan are in  (2024)     Exam  BP (!) 149/74 (BP Location: Left arm, Patient Position: Sitting, Cuff Size: Adult Regular)   Pulse 93   Temp 98.1  F (36.7  C) (Oral)   Resp 18   Ht 1.727 m (5' 7.99\")   Wt 68.4 kg (150 lb 12.8 oz)   SpO2 99%   BMI 22.93 kg/m      Gen Alert pleasant NAD  Resp No difficulty breathing. No cough  Skin No Jaundice  Eyes No icterus  Neuro HOOPER  MSK no muscle wasting  Psyche Pleasant, appropriate. Well groomed.    Time today in minutes 100  Record review 15  Communication with care team 0  Face to face with patient 65  Documentation 20    The longitudinal plan of care for the diagnosis(es)/condition(s) as documented were addressed during this visit. Due to the added complexity in care, I will continue to support Annalise in the subsequent management and with ongoing continuity of care.          Again, thank you for allowing me to participate in the care of your patient.        Sincerely,        Elle Price MD    Electronically signed"

## 2025-01-09 LAB — CANCER AG19-9 SERPL IA-ACNC: 4 U/ML

## 2025-01-28 ENCOUNTER — TELEPHONE (OUTPATIENT)
Dept: GASTROENTEROLOGY | Facility: CLINIC | Age: 45
End: 2025-01-28
Payer: COMMERCIAL

## 2025-01-28 NOTE — TELEPHONE ENCOUNTER
"Endoscopy Scheduling Screen    Have you had any respiratory illness or flu-like symptoms in the last 10 days?  No    What is your communication preference for Instructions and/or Bowel Prep?   MyChart    What insurance is in the chart?  Other:       Ordering/Referring Provider: brett Luna    (If ordering provider performs procedure, schedule with ordering provider unless otherwise instructed. )    BMI: Estimated body mass index is 22.93 kg/m  as calculated from the following:    Height as of 1/7/25: 1.727 m (5' 7.99\").    Weight as of 1/7/25: 68.4 kg (150 lb 12.8 oz).     Sedation Ordered  moderate sedation.   If patient BMI > 50 do not schedule in [facility].    If patient BMI > 45 do not schedule at [facility].    Are you taking methadone or Suboxone?  NO, No RN review required.    Have you been diagnosed and are being treated for severe PTSD or severe anxiety?  NO, No RN review required.    Are you taking any prescription medications for pain 3 or more times per week?   NO, No RN review required.    Do you have a history of malignant hyperthermia?  No    (Females) Are you currently pregnant?   No     Have you been diagnosed or told you have pulmonary hypertension?   No    Do you have an LVAD?  No    Have you been told you have moderate to severe sleep apnea?  No.    Have you been told you have COPD, asthma, or any other lung disease?  No    Do you have any heart conditions?  No     Have you ever had or are you waiting for an organ transplant?  No. Continue scheduling, no site restrictions.    Have you had a stroke or transient ischemic attack (TIA aka \"mini stroke\" in the last 6 months?   No    Have you been diagnosed with or been told you have cirrhosis of the liver?   Yes. (RN Review required for scheduling unless scheduling in Hospital.)    Are you currently on dialysis?   No    Do you need assistance transferring?   No    BMI: Estimated body mass index is 22.93 kg/m  as calculated from the following:    Height as of " "1/7/25: 1.727 m (5' 7.99\").    Weight as of 1/7/25: 68.4 kg (150 lb 12.8 oz).     Is patients BMI > 40 and scheduling location [facility]?  No    Do you take an injectable or oral medication for weight loss or diabetes (excluding insulin)?  No    Do you take the medication Naltrexone?  No    Do you take blood thinners?  No       Prep   Are you currently on dialysis or do you have chronic kidney disease?  No    Do you have a diagnosis of diabetes?  No    Do you have a diagnosis of cystic fibrosis (CF)?  No    On a regular basis do you go 3 -5 days between bowel movements?  No    BMI > 40?  No    Preferred Pharmacy:    [id number] - [place], [place] - [address]  [place] [address]  Phone: [phone] Fax: [phone]      Final Scheduling Details     Procedure scheduled  Upper endoscopy (EGD)    Surgeon:  Cheryl      Date of procedure:  04/15/2025     Pre-OP / PAC:   No - Not required for this site.    Location  [facility] - Per order.    Sedation   Moderate Sedation - Per order.      Patient Reminders:   You will receive a call from a Nurse to review instructions and health history.  This assessment must be completed prior to your procedure.  Failure to complete the Nurse assessment may result in the procedure being cancelled.      On the day of your procedure, please designate an adult(s) who can drive you home stay with you for the next 24 hours. The medicines used in the exam will make you sleepy. You will not be able to drive.      You cannot take public transportation, ride share services, or non-medical taxi service without a responsible caregiver.  Medical transport services are allowed with the requirement that a responsible caregiver will receive you at your destination.  We require that drivers and caregivers are confirmed prior to your procedure.  "

## 2025-04-05 ENCOUNTER — HEALTH MAINTENANCE LETTER (OUTPATIENT)
Age: 45
End: 2025-04-05

## 2025-04-15 ENCOUNTER — HOSPITAL ENCOUNTER (OUTPATIENT)
Facility: CLINIC | Age: 45
Discharge: HOME | End: 2025-04-15
Attending: INTERNAL MEDICINE | Admitting: INTERNAL MEDICINE
Payer: COMMERCIAL

## 2025-04-15 VITALS
HEART RATE: 103 BPM | RESPIRATION RATE: 20 BRPM | OXYGEN SATURATION: 98 % | SYSTOLIC BLOOD PRESSURE: 134 MMHG | DIASTOLIC BLOOD PRESSURE: 80 MMHG | HEIGHT: 67 IN | WEIGHT: 150 LBS | BODY MASS INDEX: 23.54 KG/M2

## 2025-04-15 LAB — UPPER GI ENDOSCOPY: NORMAL

## 2025-04-15 PROCEDURE — 250N000009 HC RX 250: Performed by: INTERNAL MEDICINE

## 2025-04-15 PROCEDURE — 88305 TISSUE EXAM BY PATHOLOGIST: CPT | Mod: 26 | Performed by: PATHOLOGY

## 2025-04-15 PROCEDURE — 88342 IMHCHEM/IMCYTCHM 1ST ANTB: CPT | Mod: 26 | Performed by: PATHOLOGY

## 2025-04-15 PROCEDURE — 88342 IMHCHEM/IMCYTCHM 1ST ANTB: CPT | Mod: TC | Performed by: INTERNAL MEDICINE

## 2025-04-15 PROCEDURE — 250N000011 HC RX IP 250 OP 636: Performed by: INTERNAL MEDICINE

## 2025-04-15 PROCEDURE — 999N000099 HC STATISTIC MODERATE SEDATION < 10 MIN: Performed by: INTERNAL MEDICINE

## 2025-04-15 PROCEDURE — 43239 EGD BIOPSY SINGLE/MULTIPLE: CPT | Performed by: INTERNAL MEDICINE

## 2025-04-15 RX ORDER — FENTANYL CITRATE 50 UG/ML
INJECTION, SOLUTION INTRAMUSCULAR; INTRAVENOUS PRN
Status: DISCONTINUED | OUTPATIENT
Start: 2025-04-15 | End: 2025-04-15 | Stop reason: HOSPADM

## 2025-04-15 ASSESSMENT — ACTIVITIES OF DAILY LIVING (ADL)
ADLS_ACUITY_SCORE: 41
ADLS_ACUITY_SCORE: 41

## 2025-04-15 NOTE — H&P
ENDOSCOPY PRE-SEDATION H&P FOR OUTPATIENT PROCEDURES    Kiel Davidson  [id number]  1980    Procedure: Endoscopy    Pre-procedure diagnosis: Cirrhosis    Past medical history:   Past Medical History:   Diagnosis Date    Autoimmune hepatitis (H)     Liver cirrhosis secondary to HOANG (H)      Patient Active Problem List   Diagnosis    Autoimmune hepatitis (H)       Past surgical history:   Past Surgical History:   Procedure Laterality Date    COLONOSCOPY N/A 12/28/2023    Procedure: COLONOSCOPY, WITH BIOPSY;  Surgeon: Elle Price MD;  Location:  GI    ESOPHAGOSCOPY, GASTROSCOPY, DUODENOSCOPY (EGD), COMBINED N/A 11/6/2020    Procedure: ESOPHAGOGASTRODUODENOSCOPY (EGD);  Surgeon: Luis Mir MD;  Location:  GI    ESOPHAGOSCOPY, GASTROSCOPY, DUODENOSCOPY (EGD), COMBINED N/A 2/5/2024    Procedure: ESOPHAGOGASTRODUODENOSCOPY, WITH BIOPSY;  Surgeon: Aram Luna MD;  Location:  GI    ESOPHAGOSCOPY, GASTROSCOPY, DUODENOSCOPY (EGD), COMBINED N/A 4/15/2024    Procedure: ESOPHAGOGASTRODUODENOSCOPY, WITH BIOPSY;  Surgeon: Aram Luna MD;  Location:  GI    GI SURGERY      EGD for liver cirrhosis    HERNIA REPAIR      as a child    IR LIVER BIOPSY PERCUTANEOUS  2/21/2023    liver biopsies      PERCUTANEOUS BIOPSY LIVER N/A 2/21/2023    Procedure: RANDOM NATIVE NEEDLE BIOPSY, LIVER, PERCUTANEOUS;  Surgeon: He Pickering MD;  [place] [facility] [place]       No current facility-administered medications for this encounter.       Allergies   Allergen Reactions    Nuts Other (See Comments)     Patient also allergic to tree nuts-has swelling of the throat    Peanut-Containing Drug Products      Other reaction(s): Swelling       History of Anesthesia/Sedation Problems: no    Physical Exam:    Mental status: alert  Heart: Normal  Lung: Normal  Assessment of patient's airway: Normal  Other as pertinent for procedure: None     Lab Results   Component Value Date    WBC 2.5 01/07/2025    WBC  3.9 06/29/2021     Lab Results   Component Value Date    RBC 3.42 01/07/2025    RBC 4.26 06/29/2021     Lab Results   Component Value Date    HGB 12.0 01/07/2025    HGB 13.5 06/29/2021     Lab Results   Component Value Date    HCT 35.7 01/07/2025    HCT 41.9 06/29/2021     Lab Results   Component Value Date     01/07/2025    MCV 98 06/29/2021     Lab Results   Component Value Date    MCH 35.1 01/07/2025    MCH 31.7 06/29/2021     Lab Results   Component Value Date    MCHC 33.6 01/07/2025    MCHC 32.2 06/29/2021     Lab Results   Component Value Date    RDW 15.7 01/07/2025    RDW 12.9 06/29/2021     Lab Results   Component Value Date    PLT 50 01/07/2025    PLT 93 06/29/2021     INR   Date Value Ref Range Status   01/07/2025 1.31 (H) 0.85 - 1.15 Final   06/29/2021 1.08 0.86 - 1.14 Final        ASA Score: See Provation note    Mallampati score:  I - Faucial pillars, soft palate, and uvula are visible    Assessment/Plan:     The patient is an appropriate candidate to receive sedation.    Informed consent was discussed with the patient/family, including the risks, benefits, potential complications and any alternative options associated with sedation.    Patient assessment completed just prior to sedation and while under constant observation by the provider. Condition determined to be adequate for proceeding with sedation.    The specific risks for the procedure were discussed with the patient at the time of informed consent and include but are not limited to perforation which could require surgery, missing significant neoplasm or lesion, hemorrhage and adverse sedative complication.      Aram Luna MD

## 2025-04-17 LAB
PATH REPORT.ADDENDUM SPEC: NORMAL
PATH REPORT.COMMENTS IMP SPEC: NORMAL
PATH REPORT.COMMENTS IMP SPEC: NORMAL
PATH REPORT.FINAL DX SPEC: NORMAL
PATH REPORT.GROSS SPEC: NORMAL
PATH REPORT.MICROSCOPIC SPEC OTHER STN: NORMAL
PATH REPORT.RELEVANT HX SPEC: NORMAL
PHOTO IMAGE: NORMAL

## 2025-04-21 NOTE — RESULT ENCOUNTER NOTE
Mr. Davidson:    At the time of your recent upper GI endoscopy, we took biopsies of small nodules in the antrum of your stomach.  These biopsies again did not show any concerning findings.    If you have any questions about your liver disease care or tests, you can call the clinic at [phone].     - Dr. Luna

## 2025-04-30 DIAGNOSIS — K75.4 AUTOIMMUNE HEPATITIS (H): ICD-10-CM

## 2025-04-30 RX ORDER — OMEPRAZOLE 20 MG/1
20 CAPSULE, DELAYED RELEASE ORAL DAILY
Qty: 90 CAPSULE | Refills: 1 | Status: SHIPPED | OUTPATIENT
Start: 2025-04-30

## 2025-06-02 ENCOUNTER — ANCILLARY PROCEDURE (OUTPATIENT)
Dept: MRI IMAGING | Facility: CLINIC | Age: 45
End: 2025-06-02
Attending: INTERNAL MEDICINE
Payer: COMMERCIAL

## 2025-06-02 DIAGNOSIS — K83.01 PSC (PRIMARY SCLEROSING CHOLANGITIS) (H): ICD-10-CM

## 2025-06-02 PROCEDURE — A9585 GADOBUTROL INJECTION: HCPCS | Performed by: INTERNAL MEDICINE

## 2025-06-02 PROCEDURE — 255N000002 HC RX 255 OP 636: Performed by: INTERNAL MEDICINE

## 2025-06-02 PROCEDURE — 74183 MRI ABD W/O CNTR FLWD CNTR: CPT

## 2025-06-02 RX ORDER — GADOBUTROL 604.72 MG/ML
7 INJECTION INTRAVENOUS ONCE
Status: COMPLETED | OUTPATIENT
Start: 2025-06-02 | End: 2025-06-02

## 2025-06-02 RX ADMIN — GADOBUTROL 7 ML: 604.72 INJECTION INTRAVENOUS at 09:15

## 2025-06-03 ENCOUNTER — RESULTS FOLLOW-UP (OUTPATIENT)
Dept: GASTROENTEROLOGY | Facility: CLINIC | Age: 45
End: 2025-06-03

## 2025-06-12 ENCOUNTER — LAB (OUTPATIENT)
Dept: LAB | Facility: CLINIC | Age: 45
End: 2025-06-12
Payer: COMMERCIAL

## 2025-06-12 ENCOUNTER — TELEPHONE (OUTPATIENT)
Dept: GASTROENTEROLOGY | Facility: CLINIC | Age: 45
End: 2025-06-12

## 2025-06-12 DIAGNOSIS — K75.4 AUTOIMMUNE HEPATITIS (H): ICD-10-CM

## 2025-06-12 DIAGNOSIS — K83.01 PSC (PRIMARY SCLEROSING CHOLANGITIS) (H): ICD-10-CM

## 2025-06-12 LAB
AFP SERPL-MCNC: 2.5 NG/ML
ALBUMIN SERPL BCG-MCNC: 3.5 G/DL (ref 3.5–5.2)
ALP SERPL-CCNC: 210 U/L (ref 40–150)
ALT SERPL W P-5'-P-CCNC: 34 U/L (ref 0–70)
ANION GAP SERPL CALCULATED.3IONS-SCNC: 8 MMOL/L (ref 7–15)
AST SERPL W P-5'-P-CCNC: 69 U/L (ref 0–45)
BILIRUB SERPL-MCNC: 3.7 MG/DL
BILIRUBIN DIRECT (ROCHE PRO & PURE): 2.68 MG/DL (ref 0–0.45)
BUN SERPL-MCNC: 10.7 MG/DL (ref 6–20)
CALCIUM SERPL-MCNC: 9 MG/DL (ref 8.8–10.4)
CHLORIDE SERPL-SCNC: 106 MMOL/L (ref 98–107)
CREAT SERPL-MCNC: 0.75 MG/DL (ref 0.67–1.17)
DACRYOCYTES BLD QL SMEAR: SLIGHT
EGFRCR SERPLBLD CKD-EPI 2021: >90 ML/MIN/1.73M2
ELLIPTOCYTES BLD QL SMEAR: SLIGHT
ERYTHROCYTE [DISTWIDTH] IN BLOOD BY AUTOMATED COUNT: 16.1 % (ref 10–15)
FRAGMENTS BLD QL SMEAR: ABNORMAL
GLUCOSE SERPL-MCNC: 94 MG/DL (ref 70–99)
HCO3 SERPL-SCNC: 23 MMOL/L (ref 22–29)
HCT VFR BLD AUTO: 35.3 % (ref 40–53)
HGB BLD-MCNC: 12 G/DL (ref 13.3–17.7)
INR PPP: 1.31 (ref 0.85–1.15)
MCH RBC QN AUTO: 36.4 PG (ref 26.5–33)
MCHC RBC AUTO-ENTMCNC: 34 G/DL (ref 31.5–36.5)
MCV RBC AUTO: 107 FL (ref 78–100)
PLAT MORPH BLD: ABNORMAL
PLATELET # BLD AUTO: 45 10E3/UL (ref 150–450)
POTASSIUM SERPL-SCNC: 4 MMOL/L (ref 3.4–5.3)
PROT SERPL-MCNC: 7.2 G/DL (ref 6.4–8.3)
PROTHROMBIN TIME: 16.7 SECONDS (ref 11.8–14.8)
RBC # BLD AUTO: 3.3 10E6/UL (ref 4.4–5.9)
RBC MORPH BLD: ABNORMAL
SODIUM SERPL-SCNC: 137 MMOL/L (ref 135–145)
WBC # BLD AUTO: 2.3 10E3/UL (ref 4–11)

## 2025-06-12 NOTE — TELEPHONE ENCOUNTER
DATE/TIME OF CALL RECEIVED FROM LAB:  06/12/25 at 10:55 AM   LAB TEST:  Platelet  LAB VALUE:  48  PROVIDER NOTIFIED?: Yes  PROVIDER NAME: Elle Price MD  DATE/TIME LAB VALUE REPORTED TO PROVIDER: 6/12/2025  MECHANISM OF PROVIDER NOTIFICATION: Phone Call  PROVIDER RESPONSE: Same or similar to previous platelet count  Jaylyn RIVERS LPN

## 2025-06-12 NOTE — TELEPHONE ENCOUNTER
Patient had questions regarding lab results.  Let him know labs are still in process. Dr. Price will send result note once reviewed.  No further questions or concerns.    Jaylyn RIVERS LPN  Hepatology Clinic     [facility] Call Center    Phone Message    May a detailed message be left on voicemail: yes     Reason for Call: Other: Requesting call back in regards to care summary note.      Action Taken: Other: hepa    Travel Screening: Not Applicable     Date of Service:

## 2025-06-14 LAB — CANCER AG19-9 SERPL IA-ACNC: 5 U/ML

## 2025-06-16 DIAGNOSIS — K75.4 AUTOIMMUNE HEPATITIS (H): Primary | ICD-10-CM

## 2025-06-16 DIAGNOSIS — K83.01 PSC (PRIMARY SCLEROSING CHOLANGITIS) (H): ICD-10-CM

## 2025-06-16 DIAGNOSIS — K74.60 CIRRHOSIS OF LIVER (H): ICD-10-CM

## 2025-07-19 ENCOUNTER — HEALTH MAINTENANCE LETTER (OUTPATIENT)
Age: 45
End: 2025-07-19

## 2025-08-19 DIAGNOSIS — K75.4 AUTOIMMUNE HEPATITIS (H): ICD-10-CM

## 2025-08-19 DIAGNOSIS — K83.01 PSC (PRIMARY SCLEROSING CHOLANGITIS) (H): Primary | ICD-10-CM

## 2025-08-19 DIAGNOSIS — K74.60 CIRRHOSIS OF LIVER (H): ICD-10-CM

## (undated) DEVICE — DECANTER BAG 2002S

## (undated) DEVICE — WIRE GUIDE BENSON STR .035X50CM G00661

## (undated) DEVICE — COVER ULTRASOUND PROBE W/GEL FLEXI-FEEL 6"X58" LF  25-FF658

## (undated) DEVICE — SOL NACL 0.9% INJ 250ML BAG 2B1322Q

## (undated) DEVICE — DRSG PRIMAPORE 02X3" 7133

## (undated) DEVICE — NDL BIOPSY TEMNO 18GAX11CM ACT1811

## (undated) DEVICE — LINEN GOWN XLG 5407

## (undated) DEVICE — CATH IV 16X1-1/4 PROTECTIV 326210

## (undated) DEVICE — LINEN TOWEL PACK X5 5464

## (undated) DEVICE — GLOVE PROTEXIS BLUE W/NEU-THERA 8.0  2D73EB80

## (undated) DEVICE — GLOVE PROTEXIS POWDER FREE SMT 7.5  2D72PT75X

## (undated) DEVICE — GOWN XLG DISP 9545

## (undated) DEVICE — Device

## (undated) DEVICE — BLADE KNIFE SURG 11 371111

## (undated) DEVICE — GELFOAM 7X12MM

## (undated) DEVICE — SPECIMEN CONTAINER W/10% BUFFERED FORMALIN 120ML 591201

## (undated) DEVICE — NDL 18GAX1.5" 305185

## (undated) RX ORDER — FENTANYL CITRATE 50 UG/ML
INJECTION, SOLUTION INTRAMUSCULAR; INTRAVENOUS
Status: DISPENSED
Start: 2023-12-28

## (undated) RX ORDER — FENTANYL CITRATE 50 UG/ML
INJECTION, SOLUTION INTRAMUSCULAR; INTRAVENOUS
Status: DISPENSED
Start: 2024-04-15

## (undated) RX ORDER — LIDOCAINE HYDROCHLORIDE 10 MG/ML
INJECTION, SOLUTION EPIDURAL; INFILTRATION; INTRACAUDAL; PERINEURAL
Status: DISPENSED
Start: 2023-02-21

## (undated) RX ORDER — FENTANYL CITRATE 50 UG/ML
INJECTION, SOLUTION INTRAMUSCULAR; INTRAVENOUS
Status: DISPENSED
Start: 2025-04-15

## (undated) RX ORDER — FENTANYL CITRATE 50 UG/ML
INJECTION, SOLUTION INTRAMUSCULAR; INTRAVENOUS
Status: DISPENSED
Start: 2024-02-05

## (undated) RX ORDER — FENTANYL CITRATE 50 UG/ML
INJECTION, SOLUTION INTRAMUSCULAR; INTRAVENOUS
Status: DISPENSED
Start: 2020-11-06